# Patient Record
Sex: MALE | Race: BLACK OR AFRICAN AMERICAN | Employment: FULL TIME | URBAN - METROPOLITAN AREA
[De-identification: names, ages, dates, MRNs, and addresses within clinical notes are randomized per-mention and may not be internally consistent; named-entity substitution may affect disease eponyms.]

---

## 2018-09-05 ENCOUNTER — HOSPITAL ENCOUNTER (EMERGENCY)
Age: 48
Discharge: HOME | End: 2018-09-05
Attending: EMERGENCY MEDICINE
Payer: SELF-PAY

## 2018-09-05 VITALS
SYSTOLIC BLOOD PRESSURE: 142 MMHG | TEMPERATURE: 97.9 F | OXYGEN SATURATION: 100 % | HEART RATE: 100 BPM | DIASTOLIC BLOOD PRESSURE: 95 MMHG | RESPIRATION RATE: 14 BRPM

## 2018-09-05 DIAGNOSIS — K64.8 INTERNAL HEMORRHOID: Primary | ICD-10-CM

## 2018-09-05 DIAGNOSIS — K60.2 RECTAL FISSURE: ICD-10-CM

## 2018-09-05 DIAGNOSIS — K62.89 RECTAL PAIN: ICD-10-CM

## 2018-09-05 DIAGNOSIS — R03.0 ELEVATED BLOOD PRESSURE READING: ICD-10-CM

## 2018-09-05 PROCEDURE — 74011000250 HC RX REV CODE- 250: Performed by: EMERGENCY MEDICINE

## 2018-09-05 PROCEDURE — 99283 EMERGENCY DEPT VISIT LOW MDM: CPT

## 2018-09-05 RX ORDER — DOCUSATE SODIUM 100 MG/1
100 CAPSULE, LIQUID FILLED ORAL 2 TIMES DAILY
Qty: 60 CAP | Refills: 2 | Status: SHIPPED | OUTPATIENT
Start: 2018-09-05 | End: 2018-12-04

## 2018-09-05 RX ORDER — LIDOCAINE HYDROCHLORIDE 20 MG/ML
JELLY TOPICAL AS NEEDED
Status: DISCONTINUED | OUTPATIENT
Start: 2018-09-05 | End: 2018-09-05 | Stop reason: HOSPADM

## 2018-09-05 RX ORDER — HYDROCORTISONE ACETATE 25 MG/1
25 SUPPOSITORY RECTAL EVERY 12 HOURS
Qty: 14 EACH | Refills: 0 | Status: SHIPPED | OUTPATIENT
Start: 2018-09-05 | End: 2018-09-12

## 2018-09-05 RX ORDER — IBUPROFEN 600 MG/1
600 TABLET ORAL
Qty: 20 TAB | Refills: 0 | Status: SHIPPED | OUTPATIENT
Start: 2018-09-05 | End: 2021-06-10 | Stop reason: ALTCHOICE

## 2018-09-05 RX ADMIN — LIDOCAINE HYDROCHLORIDE: 20 JELLY TOPICAL at 19:55

## 2018-09-05 NOTE — ED TRIAGE NOTES
Patient arrived from home c.o hemorrhoids. Patient states he has had them for a while. Patient reports pain when sitting

## 2018-09-05 NOTE — ED PROVIDER NOTES
EMERGENCY DEPARTMENT HISTORY AND PHYSICAL EXAM 
 
7:34 PM 
 
 
Date: 9/5/2018 Patient Name: Adan Low History of Presenting Illness Chief Complaint Patient presents with  Anal Pain  
  hemorrhoids History Provided By: Patient Chief Complaint: Rectal Pain Duration:  Years Timing:  Acute on chronic Location: Rectal 
Quality: Zachariah Pi Severity: Severe Modifying Factors: Exacerbated with bowel movements Associated Symptoms: denies any other associated signs or symptoms Additional History (Context): 7:35 PM Adan Low is a 52 y.o. male with no MHx who presents to ED complaining of severe sharp acute on chronic rectal pain that is exacerbated with bowel movements onset years but worsened gradually over the last month. Patient states that he has had hemorrhoids for a couple years now but have been worsening over time. The better now but today when he tried to do his Hermelinda Goody he relaly had pain. He sits down alot for work. His diet has been ok and states that he eats fruits and vegetables. Denies any fevers and other hospital visits. No other concerns or symptoms at this time. PCP: None Past History Past Medical History: No past medical history on file. Past Surgical History: No past surgical history on file. Family History: No family history on file. Social History: 
Social History Substance Use Topics  Smoking status: Not on file  Smokeless tobacco: Not on file  Alcohol use Not on file Allergies: 
No Known Allergies Review of Systems Review of Systems Constitutional: Negative for activity change, fatigue and fever. HENT: Negative for congestion and rhinorrhea. Eyes: Negative for visual disturbance. Respiratory: Negative for shortness of breath. Cardiovascular: Negative for chest pain and palpitations. Gastrointestinal: Positive for rectal pain. Negative for abdominal pain, diarrhea, nausea and vomiting. Genitourinary: Negative for dysuria and hematuria. Musculoskeletal: Negative for back pain. Skin: Negative for rash. Neurological: Negative for dizziness, weakness and light-headedness. All other systems reviewed and are negative. Physical Exam  
 
Visit Vitals  BP (!) 142/95 (BP 1 Location: Left arm, BP Patient Position: At rest)  Pulse 100  Temp 97.9 °F (36.6 °C)  Resp 14  SpO2 100% Physical Exam  
Constitutional: He is oriented to person, place, and time. He appears well-developed and well-nourished. No distress. Obese HENT:  
Head: Normocephalic and atraumatic. Right Ear: External ear normal.  
Left Ear: External ear normal.  
Nose: Nose normal.  
Mouth/Throat: Oropharynx is clear and moist.  
Eyes: Conjunctivae and EOM are normal. Pupils are equal, round, and reactive to light. No scleral icterus. Neck: Normal range of motion. Neck supple. No JVD present. No tracheal deviation present. No thyromegaly present. Cardiovascular: Normal rate, regular rhythm, normal heart sounds and intact distal pulses. Exam reveals no gallop and no friction rub. No murmur heard. Pulmonary/Chest: Effort normal and breath sounds normal. He exhibits no tenderness. Abdominal: Soft. Bowel sounds are normal. He exhibits no distension. There is no tenderness. There is no rebound and no guarding. Genitourinary: Rectal exam shows external hemorrhoid, internal hemorrhoid, fissure and tenderness. Genitourinary Comments: Multiple internal hemorrhoids No bleeding Very tender at 6 oclock position, suspect there is a rectal fissure. Nontender external hemorrhoid noted at he 6 oclock position as well. Musculoskeletal: Normal range of motion. He exhibits no edema or tenderness. Lymphadenopathy:  
  He has no cervical adenopathy. Neurological: He is alert and oriented to person, place, and time. No cranial nerve deficit.  Coordination normal.  
 No sensory loss, Gait normal, Motor 5/5 Skin: Skin is warm and dry. Psychiatric: He has a normal mood and affect. His behavior is normal. Judgment and thought content normal.  
Nursing note and vitals reviewed. Diagnostic Study Results Labs - No results found for this or any previous visit (from the past 12 hour(s)). Radiologic Studies - No orders to display Medical Decision Making I am the first provider for this patient. I reviewed the vital signs, available nursing notes, past medical history, past surgical history, family history and social history. Vital Signs-Reviewed the patient's vital signs. Pulse Oximetry Analysis -  100% on room air , WNL Cardiac Monitor: 
Rate: 100 bpm 
 
Records Reviewed: Nursing Notes and Old Medical Records (Time of Review: 7:34 PM) 
 
ED Course: Progress Notes, Reevaluation, and Consults: 
 
 
Provider Notes (Medical Decision Making):  
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 There is no bleeding. Exam suggestive of hemorrhoids and rectal fissure. Will give lidocaine jelly for comforts, call in an anal suppository as well as ibuprofen and give colace for his pain. Diagnosis Clinical Impression:  
1. Internal hemorrhoid 2. Rectal pain 3. Elevated blood pressure reading 4. Rectal fissure Disposition: DC Follow-up Information Follow up With Details Comments Contact Info [facility] EMERGENCY DEPT Go to If symptoms worsen, As needed [address] [place] [address] [address] [facility] Char Springer MD Schedule an appointment as soon as possible for a visit in 1 week  [address] 
[phone] Patient's Medications Start Taking DOCUSATE SODIUM (COLACE) 100 MG CAPSULE    Take 1 Cap by mouth two (2) times a day for 90 days. HYDROCORTISONE (ANUSOL-HC) 25 MG SUPP    Insert 1 Suppository into rectum every twelve (12) hours for 7 days. IBUPROFEN (MOTRIN) 600 MG TABLET    Take 1 Tab by mouth every six (6) hours as needed for Pain. Continue Taking No medications on file These Medications have changed No medications on file Stop Taking No medications on file  
 
_______________________________ Attestations: 
Scribe Attestation Tsering Almazan acting as a scribe for and in the presence of Aliza Matt MD     
September 05, 2018 at 7:34 PM 
    
Provider Attestation:     
I personally performed the services described in the documentation, reviewed the documentation, as recorded by the scribe in my presence, and it accurately and completely records my words and actions. September 05, 2018 at 7:34 PM - Aliza Matt MD   
_______________________________

## 2018-09-06 NOTE — DISCHARGE INSTRUCTIONS
Anal Fissure: Care Instructions  Your Care Instructions  An anal fissure is a tear in the lining of the lower rectum (anus). It can itch and cause pain. You may notice bright red blood on toilet paper after you wipe. A fissure may form if you are constipated and try to pass a large, hard stool or if you do not relax your anal muscles during a bowel movement. Most anal fissures heal with home treatment after a few days or weeks. If you have an anal fissure that takes more time to heal, your doctor may prescribe medicine. In rare cases, surgery may be needed. Anal fissures do not lead to colon cancer or other serious illnesses. However, if you have blood mixed in with the stool, talk to your doctor. Follow-up care is a key part of your treatment and safety. Be sure to make and go to all appointments, and call your doctor if you are having problems. It's also a good idea to know your test results and keep a list of the medicines you take. How can you care for yourself at home? · If your doctor prescribed cream or ointment, use it exactly as prescribed. Call your doctor if you think you are having a problem with your medicine. You will get more details on the specific medicines your doctor prescribes. · Sit in a few inches of warm water (sitz bath) 3 times a day and after bowel movements. The warm water helps the area heal and eases discomfort. Do not put soaps, salts, or shampoos in the water. · Avoid constipation:  ¨ Include fruits, vegetables, beans, and whole grains in your diet each day. These foods are high in fiber. ¨ Drink plenty of fluids, enough so that your urine is light yellow or clear like water. If you have kidney, heart, or liver disease and have to limit fluids, talk with your doctor before you increase the amount of fluids you drink. ¨ Get some exercise every day. Build up slowly to 30 to 60 minutes a day on 5 or more days of the week.   ¨ Take a fiber supplement, such as Benefiber, Citrucel, or Metamucil, every day if needed. Read and follow all instructions on the label. ¨ Use the toilet when you feel the urge. Or when you can, schedule time each day for a bowel movement. A daily routine may help. Take your time and do not strain when having a bowel movement. But do not sit on the toilet too long. · Support your feet with a small step stool when you sit on the toilet. This helps flex your hips and places your pelvis in a squatting position. · Your doctor may recommend an over-the-counter laxative, such as Miralax, Milk of Magnesia, or Ex-Lax. Read and follow all instructions on the label, and do not use these medicines on a long-term basis. · Do not use over-the-counter ointments or creams without talking to your doctor. Some of these preparations may not help. · Use baby wipes or medicated pads, such as Preparation H or Tucks, instead of toilet paper to clean after a bowel movement. These products do not irritate the anus. · Be safe with medicines. Read and follow all instructions on the label. ¨ If the doctor gave you a prescriptionmedicine for pain, take it as prescribed. ¨ If you are not taking a prescription pain medicine, ask your doctorif you can take an over-the-counter medicine. When should you call for help? Call your doctor now or seek immediate medical care if:    · You have new or worse pain.     · You have new or worse bleeding from the rectum.    Watch closely for changes in your health, and be sure to contact your doctor if:    · You have trouble passing stools.     · You do not get better as expected. Where can you learn more? Go to http://branden-efren.info/. Enter Y329 in the search box to learn more about \"Anal Fissure: Care Instructions. \"  Current as of: May 12, 2017  Content Version: 11.7  © 0913-1727 "[employer].  Care instructions adapted under license by [employer] (which disclaims liability or warranty for this information). If you have questions about a medical condition or this instruction, always ask your healthcare professional. [address] any warranty or liability for your use of this information. Hemorrhoids: Care Instructions  Your Care Instructions    Hemorrhoids are enlarged veins that develop in the anal canal. Bleeding during bowel movements, itching, swelling, and rectal pain are the most common symptoms. They can be uncomfortable at times, but hemorrhoids rarely are a serious problem. You can treat most hemorrhoids with simple changes to your diet and bowel habits. These changes include eating more fiber and not straining to pass stools. Most hemorrhoids do not need surgery or other treatment unless they are very large and painful or bleed a lot. Follow-up care is a key part of your treatment and safety. Be sure to make and go to all appointments, and call your doctor if you are having problems. It's also a good idea to know your test results and keep a list of the medicines you take. How can you care for yourself at home? · Sit in a few inches of warm water (sitz bath) 3 times a day and after bowel movements. The warm water helps with pain and itching. · Put ice on your anal area several times a day for 10 minutes at a time. Put a thin cloth between the ice and your skin. Follow this by placing a warm, wet towel on the area for another 10 to 20 minutes. · Take pain medicines exactly as directed. ¨ If the doctor gave you a prescription medicine for pain, take it as prescribed. ¨ If you are not taking a prescription pain medicine, ask your doctor if you can take an over-the-counter medicine. · Keep the anal area clean, but be gentle. Use water and a fragrance-free soap, such as [place], or use baby wipes or medicated pads, such as Tucks. · Wear cotton underwear and loose clothing to decrease moisture in the anal area. · Eat more fiber.  Include foods such as whole-grain breads and cereals, raw vegetables, raw and dried fruits, and beans. · Drink plenty of fluids, enough so that your urine is light yellow or clear like water. If you have kidney, heart, or liver disease and have to limit fluids, talk with your doctor before you increase the amount of fluids you drink. · Use a stool softener that contains bran or psyllium. You can save money by buying bran or psyllium (available in bulk at most health food stores) and sprinkling it on foods or stirring it into fruit juice. Or you can use a product such as Metamucil or Hydrocil. · Practice healthy bowel habits. ¨ Go to the bathroom as soon as you have the urge. ¨ Avoid straining to pass stools. Relax and give yourself time to let things happen naturally. ¨ Do not hold your breath while passing stools. ¨ Do not read while sitting on the toilet. Get off the toilet as soon as you have finished. · Take your medicines exactly as prescribed. Call your doctor if you think you are having a problem with your medicine. When should you call for help? Call 911 anytime you think you may need emergency care. For example, call if:    · You pass maroon or very bloody stools.    Call your doctor now or seek immediate medical care if:    · You have increased pain.     · You have increased bleeding.    Watch closely for changes in your health, and be sure to contact your doctor if:    · Your symptoms have not improved after 3 or 4 days. Where can you learn more? Go to http://branden-efren.info/. Enter F228 in the search box to learn more about \"Hemorrhoids: Care Instructions. \"  Current as of: May 12, 2017  Content Version: 11.7  © 0846-0594 [employer]. Care instructions adapted under license by [employer] (which disclaims liability or warranty for this information).  If you have questions about a medical condition or this instruction, always ask your healthcare professional. [employer] disclaims any warranty or liability for your use of this information.

## 2019-07-22 ENCOUNTER — OFFICE VISIT (OUTPATIENT)
Dept: SURGERY | Age: 49
End: 2019-07-22

## 2019-07-22 VITALS
RESPIRATION RATE: 18 BRPM | BODY MASS INDEX: 44.1 KG/M2 | SYSTOLIC BLOOD PRESSURE: 132 MMHG | DIASTOLIC BLOOD PRESSURE: 84 MMHG | HEART RATE: 75 BPM | WEIGHT: 315 LBS | HEIGHT: 71 IN | TEMPERATURE: 98.3 F

## 2019-07-22 DIAGNOSIS — K62.5 RECTAL BLEEDING: ICD-10-CM

## 2019-07-22 DIAGNOSIS — E66.01 OBESITY, MORBID (HCC): ICD-10-CM

## 2019-07-22 DIAGNOSIS — K64.0 GRADE I HEMORRHOIDS: Primary | ICD-10-CM

## 2019-07-22 NOTE — PATIENT INSTRUCTIONS
Metamucil or Citrucel fiber powder daily    Discontinue use of all wipes     Use bland soap such as Dove    Do not over clean area, 1-2 swipes then done    After washing use barrier cream such as Desitin or A & D ointment

## 2019-07-22 NOTE — LETTER
7/22/19 Patient: Elizabeth Sandoval YOB: 1970 Date of Visit: 7/22/2019 Lizz Hart, [address]: [phone] Dear Kim Walter, I saw Ramsey Lofton in the office today for his hemorrhoids. They have been particularly worse over the last 3 weeks. On exam he really does not have any significant hemorrhoidal disease. I have recommended a high-fiber diet with a fiber supplement and we will schedule him for a colonoscopy to rule out other causes of bleeding. We have also discussed proper perianal hygiene as this may be causing some discomfort as well. He will follow-up in the office PRN. If you have questions, please do not hesitate to call me. I look forward to following your patient along with you. Sincerely, Rosita Jenkins MD

## 2019-07-22 NOTE — PROGRESS NOTES
HPI: Brady Ramon is a 50 y.o. male presenting with chief complain of hemorrhoids. He has suffered from this for the last 3 to 4 years but it has been worse over the last 3 weeks. He states he sees some blood on the toilet paper with wiping. It is alleviated with ibuprofen. Sometimes bowel movements make it more painful. He denies fevers or chills. He denies drainage. He denies abdominal complaints. He moves his bowels once per day. He occasionally takes a stool softener. He denies fecal incontinence. He has never had a colonoscopy and denies family history of colorectal cancer. Past medical history: Negative    Past surgical history: Negative    Family History   Problem Relation Age of Onset    Diabetes Mother        Social History     Socioeconomic History    Marital status:      Spouse name: Not on file    Number of children: Not on file    Years of education: Not on file    Highest education level: Not on file   Tobacco Use    Smoking status: Never Smoker    Smokeless tobacco: Never Used   Substance and Sexual Activity    Alcohol use: Never     Frequency: Never       Review of Systems - Review of Systems   Constitutional: Negative. HENT: Negative. Eyes: Negative. Respiratory: Negative. Cardiovascular: Negative. Gastrointestinal: Negative. Genitourinary: Negative. Musculoskeletal: Negative. Skin: Negative. Neurological: Negative. Endo/Heme/Allergies: Negative. Psychiatric/Behavioral: Negative. Outpatient Medications Marked as Taking for the 7/22/19 encounter (Office Visit) with Almaz Shabazz MD   Medication Sig Dispense Refill    ibuprofen (MOTRIN) 600 mg tablet Take 1 Tab by mouth every six (6) hours as needed for Pain. 20 Tab 0       No Known Allergies    Vitals:    07/22/19 1314   Weight: 143.3 kg (316 lb)   PainSc:   0 - No pain       Physical Exam   Constitutional: He appears well-developed and well-nourished.    HENT: Head: Normocephalic and atraumatic. Eyes: Conjunctivae and EOM are normal.   Abdominal: Soft. He exhibits no distension and no mass. There is no tenderness. Musculoskeletal: Normal range of motion. Lymphadenopathy:     He has no cervical adenopathy. Right: No inguinal adenopathy present. Left: No inguinal adenopathy present. Neurological: No sensory deficit. Skin: Skin is warm and dry. Psychiatric: He has a normal mood and affect. His speech is normal.   Rectum: No external hemorrhoids, no fissure, no abscess  Digital rectal exam: Moderate tone, no mass  Anoscopy: No significant internal hemorrhoids    Assessment / Plan    Grade 1 hemorrhoids, rectal bleeding  Schedule colonoscopy to rule out other causes of rectal bleeding  High-fiber diet with fiber supplement  Hemorrhoids are essentially very mild and I do not think that hemorrhoidectomy would benefit him    The diagnoses and plan were discussed with the patient. All questions answered. Plan of care agreed to by all concerned.

## 2020-09-09 ENCOUNTER — OFFICE VISIT (OUTPATIENT)
Dept: CARDIOLOGY CLINIC | Age: 50
End: 2020-09-09

## 2020-09-09 VITALS
WEIGHT: 292.8 LBS | HEART RATE: 95 BPM | TEMPERATURE: 97.3 F | OXYGEN SATURATION: 99 % | DIASTOLIC BLOOD PRESSURE: 75 MMHG | HEIGHT: 71 IN | BODY MASS INDEX: 40.99 KG/M2 | SYSTOLIC BLOOD PRESSURE: 143 MMHG

## 2020-09-09 DIAGNOSIS — R94.31 ABNORMAL EKG: Primary | ICD-10-CM

## 2020-09-09 DIAGNOSIS — E66.01 CLASS 3 SEVERE OBESITY WITHOUT SERIOUS COMORBIDITY WITH BODY MASS INDEX (BMI) OF 40.0 TO 44.9 IN ADULT, UNSPECIFIED OBESITY TYPE (HCC): ICD-10-CM

## 2020-09-09 DIAGNOSIS — I45.10 RIGHT BUNDLE BRANCH BLOCK: ICD-10-CM

## 2020-09-09 DIAGNOSIS — R60.0 BILATERAL LEG EDEMA: ICD-10-CM

## 2020-09-09 DIAGNOSIS — R00.2 PALPITATION: ICD-10-CM

## 2020-09-09 NOTE — PROGRESS NOTES
HISTORY OF PRESENT ILLNESS  James Katz is a 52 y.o. male. 9/2020  Patient seen today for new patient evaluation. He is referred here for abnormal EKG  For about a month he has been having episode of palpitation described as a thumping the heart rate happens on and off. Denies any dizziness or syncope. EKG today shows right bundle branch block. He denies any chest pain, shortness of breath he has episodes of edema in legs is going on and off for some time. Is a more dependent. Denies orthopnea PND. No fever chills or other symptom. No prior cardiac history      Review of Systems   Constitutional: Negative for chills and fever. HENT: Negative for nosebleeds. Eyes: Negative for blurred vision and double vision. Respiratory: Negative for cough, hemoptysis, sputum production, shortness of breath and wheezing. Cardiovascular: Negative for chest pain, palpitations, orthopnea, claudication, leg swelling and PND. Gastrointestinal: Negative for abdominal pain, heartburn, nausea and vomiting. Musculoskeletal: Negative for myalgias. Skin: Negative for rash. Neurological: Negative for dizziness, weakness and headaches. Endo/Heme/Allergies: Does not bruise/bleed easily. Family History   Problem Relation Age of Onset    Diabetes Mother     Stroke Father        History reviewed. No pertinent past medical history. History reviewed. No pertinent surgical history. Social History     Tobacco Use    Smoking status: Never Smoker    Smokeless tobacco: Never Used   Substance Use Topics    Alcohol use: Never     Frequency: Never       No Known Allergies    Prior to Admission medications    Medication Sig Start Date End Date Taking? Authorizing Provider   ibuprofen (MOTRIN) 600 mg tablet Take 1 Tab by mouth every six (6) hours as needed for Pain.  9/5/18   Perfecto Tate MD         Visit Vitals  /75 (BP 1 Location: Left arm, BP Patient Position: Sitting)   Pulse 95   Temp 97.3 °F (36.3 °C) (Temporal)   Ht 5' 11\" (1.803 m)   Wt 132.8 kg (292 lb 12.8 oz)   SpO2 99%   BMI 40.84 kg/m²         Physical Exam   Constitutional: He is oriented to person, place, and time. He appears well-developed and well-nourished. HENT:   Head: Normocephalic and atraumatic. Eyes: Conjunctivae are normal.   Neck: Neck supple. No JVD present. No tracheal deviation present. No thyromegaly present. Cardiovascular: Normal rate, regular rhythm and normal heart sounds. Exam reveals no gallop and no friction rub. No murmur heard. Pulmonary/Chest: Breath sounds normal. No respiratory distress. He has no wheezes. He has no rales. He exhibits no tenderness. Abdominal: Soft. There is no abdominal tenderness. Musculoskeletal:         General: No edema. Neurological: He is alert and oriented to person, place, and time. Skin: Skin is warm and dry. Psychiatric: He has a normal mood and affect. Mr. Alta Melvin has a reminder for a \"due or due soon\" health maintenance. I have asked that he contact his primary care provider for follow-up on this health maintenance. No flowsheet data found. Assessment         ICD-10-CM ICD-9-CM    1. Abnormal EKG  R94.31 794.31 AMB POC EKG ROUTINE W/ 12 LEADS, INTER & REP      CARDIAC HOLTER MONITOR      ECHO ADULT COMPLETE      EXERCISE CARDIAC STRESS TEST      METABOLIC PANEL, BASIC      CBC WITH AUTOMATED DIFF      T4, FREE      TSH 3RD GENERATION      LIPID PANEL    New right bundle branch block on EKG. 2. Right bundle branch block  I45.10 426.4 CARDIAC HOLTER MONITOR      ECHO ADULT COMPLETE      EXERCISE CARDIAC STRESS TEST      METABOLIC PANEL, BASIC      CBC WITH AUTOMATED DIFF      T4, FREE      TSH 3RD GENERATION      LIPID PANEL    Unclear etiology.    3. Palpitation  R00.2 785.1 CARDIAC HOLTER MONITOR      ECHO ADULT COMPLETE      EXERCISE CARDIAC STRESS TEST      METABOLIC PANEL, BASIC      CBC WITH AUTOMATED DIFF      T4, FREE      TSH 3RD GENERATION      LIPID PANEL    Rule out arrhythmia or heart block   4. Class 3 severe obesity without serious comorbidity with body mass index (BMI) of 40.0 to 44.9 in adult, unspecified obesity type ([facility]  E66.01 278.01     Z68.41 V85.41     Continue with diet and exercise   5. Bilateral leg edema  R60.0 782.3     Rule hypertension or LV dysfunction       There are no discontinued medications. Orders Placed This Encounter    METABOLIC PANEL, BASIC     Standing Status:   Future     Standing Expiration Date:   10/9/2020    CBC WITH AUTOMATED DIFF     Standing Status:   Future     Standing Expiration Date:   10/9/2020    T4, FREE     Standing Status:   Future     Standing Expiration Date:   9/10/2021    TSH 3RD GENERATION     Standing Status:   Future     Standing Expiration Date:   10/9/2020    LIPID PANEL     Standing Status:   Future     Standing Expiration Date:   3/10/2021    AMB POC EKG ROUTINE W/ 12 LEADS, INTER & REP     Order Specific Question:   Reason for Exam:     Answer:   Abnormal EKG    ECHO ADULT COMPLETE     Standing Status:   Future     Standing Expiration Date:   9/9/2021     Order Specific Question:   Contrast Enhancement (Bubble Study, Definity, Optison) may be used if criteria listed in established evidence-based protocol has been identified. Answer:   Yes       Follow-up and Dispositions    · Return for F/u after tests.

## 2020-10-05 DIAGNOSIS — R94.31 ABNORMAL EKG: ICD-10-CM

## 2020-10-05 DIAGNOSIS — I45.10 RIGHT BUNDLE BRANCH BLOCK: ICD-10-CM

## 2020-10-05 DIAGNOSIS — R00.2 PALPITATION: ICD-10-CM

## 2020-10-12 ENCOUNTER — OFFICE VISIT (OUTPATIENT)
Dept: CARDIOLOGY CLINIC | Age: 50
End: 2020-10-12
Payer: COMMERCIAL

## 2020-10-12 VITALS
HEIGHT: 71 IN | TEMPERATURE: 97 F | WEIGHT: 294.6 LBS | DIASTOLIC BLOOD PRESSURE: 75 MMHG | BODY MASS INDEX: 41.24 KG/M2 | HEART RATE: 78 BPM | OXYGEN SATURATION: 98 % | SYSTOLIC BLOOD PRESSURE: 142 MMHG

## 2020-10-12 DIAGNOSIS — I45.10 RIGHT BUNDLE BRANCH BLOCK: ICD-10-CM

## 2020-10-12 DIAGNOSIS — R94.31 ABNORMAL EKG: Primary | ICD-10-CM

## 2020-10-12 DIAGNOSIS — I51.7 LVH (LEFT VENTRICULAR HYPERTROPHY): ICD-10-CM

## 2020-10-12 DIAGNOSIS — R60.0 BILATERAL LEG EDEMA: ICD-10-CM

## 2020-10-12 PROCEDURE — 99213 OFFICE O/P EST LOW 20 MIN: CPT | Performed by: INTERNAL MEDICINE

## 2020-10-12 NOTE — PROGRESS NOTES
1. Have you been to the ER, urgent care clinic since your last visit? Hospitalized since your last visit? No    2. Have you seen or consulted any other health care providers outside of the [address] [place] [address] since your last visit? Include any pap smears or colon screening.  No

## 2020-10-12 NOTE — PROGRESS NOTES
HISTORY OF PRESENT ILLNESS  Augustina Valentin is a 52 y.o. male. 9/2020  Patient seen today for new patient evaluation. He is referred here for abnormal EKG  For about a month he has been having episode of palpitation described as a thumping the heart rate happens on and off. Denies any dizziness or syncope. EKG today shows right bundle branch block. He denies any chest pain, shortness of breath he has episodes of edema in legs is going on and off for some time. Is a more dependent. Denies orthopnea PND. No fever chills or other symptom. No prior cardiac history      Review of Systems   Constitutional: Negative for chills and fever. HENT: Negative for nosebleeds. Eyes: Negative for blurred vision and double vision. Respiratory: Negative for cough, hemoptysis, sputum production, shortness of breath and wheezing. Cardiovascular: Negative for chest pain, palpitations, orthopnea, claudication, leg swelling and PND. Gastrointestinal: Negative for abdominal pain, heartburn, nausea and vomiting. Musculoskeletal: Negative for myalgias. Skin: Negative for rash. Neurological: Negative for dizziness, weakness and headaches. Endo/Heme/Allergies: Does not bruise/bleed easily. Family History   Problem Relation Age of Onset    Diabetes Mother     Stroke Father        No past medical history on file. No past surgical history on file. Social History     Tobacco Use    Smoking status: Never Smoker    Smokeless tobacco: Never Used   Substance Use Topics    Alcohol use: Never     Frequency: Never       No Known Allergies    Prior to Admission medications    Medication Sig Start Date End Date Taking? Authorizing Provider   ibuprofen (MOTRIN) 600 mg tablet Take 1 Tab by mouth every six (6) hours as needed for Pain.  9/5/18   Christa Fuentes MD         Visit Vitals  BP (!) 142/75 (BP 1 Location: Left arm, BP Patient Position: Sitting)   Pulse 78   Temp 97 °F (36.1 °C) (Temporal) Ht 5' 11\" (1.803 m)   Wt 133.6 kg (294 lb 9.6 oz)   SpO2 98%   BMI 41.09 kg/m²         Physical Exam   Constitutional: He is oriented to person, place, and time. He appears well-developed and well-nourished. HENT:   Head: Normocephalic and atraumatic. Eyes: Conjunctivae are normal.   Neck: Neck supple. No JVD present. No tracheal deviation present. No thyromegaly present. Cardiovascular: Normal rate, regular rhythm and normal heart sounds. Exam reveals no gallop and no friction rub. No murmur heard. Pulmonary/Chest: Breath sounds normal. No respiratory distress. He has no wheezes. He has no rales. He exhibits no tenderness. Abdominal: Soft. There is no abdominal tenderness. Musculoskeletal:         General: No edema. Neurological: He is alert and oriented to person, place, and time. Skin: Skin is warm and dry. Psychiatric: He has a normal mood and affect. Mr. Jack Harper has a reminder for a \"due or due soon\" health maintenance. I have asked that he contact his primary care provider for follow-up on this health maintenance. No flowsheet data found. Interpretation Summary 10/2020    · LV: Estimated LVEF is 55 - 60%. Normal cavity size, systolic function (ejection fraction normal) and diastolic function. Moderate concentric hypertrophy. Wall motion: normal.  · LA: Left Atrium volume index is 27.14 mL/m2. · RV: Normal right ventricular size and function. · No hemodynamically significant valvular pathology. · Pericardium: Pericardial fat pad present. Stress test-9/2020  negatine     Holter-10/2020  No significant arrhythmia   Assessment         ICD-10-CM ICD-9-CM    1. Abnormal EKG  R94.31 794.31     stable  negative stress test  monitor   2. Bilateral leg edema  R60.0 782.3     normal lvef  no pulmonary htn  leg elevation-support stockings   3. Right bundle branch block  I45.10 426.4    4. LVH (left ventricular hypertrophy)  I51.7 429.3     Moderate.   Continue with diet and exercise and monitor blood pressure. 10/2020  Stable cardiac status. lvh on echo-diet exercise  There are no discontinued medications. No orders of the defined types were placed in this encounter. Follow-up and Dispositions    · Return in about 1 year (around 10/12/2021).

## 2020-11-23 PROBLEM — Z12.11 SCREENING FOR MALIGNANT NEOPLASM OF COLON: Status: ACTIVE | Noted: 2020-11-13

## 2020-11-23 PROBLEM — I87.2 PERIPHERAL VENOUS INSUFFICIENCY: Status: ACTIVE | Noted: 2020-11-23

## 2020-11-23 PROBLEM — R60.0 PEDAL EDEMA: Status: ACTIVE | Noted: 2020-11-23

## 2020-11-23 PROBLEM — Z83.3 FAMILY HISTORY OF DIABETES MELLITUS: Status: ACTIVE | Noted: 2020-11-23

## 2020-11-23 PROBLEM — E11.9 DIABETES MELLITUS WITHOUT COMPLICATION (HCC): Status: ACTIVE | Noted: 2020-11-23

## 2020-11-23 PROBLEM — N52.9 IMPOTENCE: Status: ACTIVE | Noted: 2020-11-23

## 2020-11-23 PROBLEM — E78.5 HYPERLIPIDEMIA: Status: ACTIVE | Noted: 2020-11-23

## 2020-11-23 PROBLEM — D72.820 LYMPHOCYTOSIS: Status: ACTIVE | Noted: 2020-11-23

## 2021-07-09 PROCEDURE — 75810000223 HC DENTAL PROCEDURE

## 2021-07-09 PROCEDURE — 99283 EMERGENCY DEPT VISIT LOW MDM: CPT

## 2021-07-10 ENCOUNTER — APPOINTMENT (OUTPATIENT)
Dept: CT IMAGING | Age: 51
End: 2021-07-10
Attending: STUDENT IN AN ORGANIZED HEALTH CARE EDUCATION/TRAINING PROGRAM
Payer: COMMERCIAL

## 2021-07-10 ENCOUNTER — HOSPITAL ENCOUNTER (EMERGENCY)
Age: 51
Discharge: HOME | End: 2021-07-10
Attending: EMERGENCY MEDICINE
Payer: COMMERCIAL

## 2021-07-10 VITALS
HEIGHT: 71 IN | BODY MASS INDEX: 42.7 KG/M2 | OXYGEN SATURATION: 99 % | HEART RATE: 93 BPM | DIASTOLIC BLOOD PRESSURE: 100 MMHG | TEMPERATURE: 99.3 F | WEIGHT: 305 LBS | SYSTOLIC BLOOD PRESSURE: 134 MMHG | RESPIRATION RATE: 16 BRPM

## 2021-07-10 DIAGNOSIS — K04.7 DENTAL ABSCESS: Primary | ICD-10-CM

## 2021-07-10 LAB
ANION GAP SERPL CALC-SCNC: 6 MMOL/L (ref 3–18)
BASOPHILS # BLD: 0.1 K/UL (ref 0–0.1)
BASOPHILS NFR BLD: 1 % (ref 0–2)
BUN SERPL-MCNC: 6 MG/DL (ref 7–18)
BUN/CREAT SERPL: 5 (ref 12–20)
CALCIUM SERPL-MCNC: 9.5 MG/DL (ref 8.5–10.1)
CHLORIDE SERPL-SCNC: 101 MMOL/L (ref 100–111)
CO2 SERPL-SCNC: 31 MMOL/L (ref 21–32)
CREAT SERPL-MCNC: 1.14 MG/DL (ref 0.6–1.3)
DIFFERENTIAL METHOD BLD: NORMAL
EOSINOPHIL # BLD: 0.2 K/UL (ref 0–0.4)
EOSINOPHIL NFR BLD: 2 % (ref 0–5)
ERYTHROCYTE [DISTWIDTH] IN BLOOD BY AUTOMATED COUNT: 13.4 % (ref 11.6–14.5)
GLUCOSE SERPL-MCNC: 113 MG/DL (ref 74–99)
HCT VFR BLD AUTO: 40 % (ref 36–48)
HGB BLD-MCNC: 13.3 G/DL (ref 13–16)
LYMPHOCYTES # BLD: 3.1 K/UL (ref 0.9–3.6)
LYMPHOCYTES NFR BLD: 33 % (ref 21–52)
MCH RBC QN AUTO: 28.7 PG (ref 24–34)
MCHC RBC AUTO-ENTMCNC: 33.3 G/DL (ref 31–37)
MCV RBC AUTO: 86.4 FL (ref 74–97)
MONOCYTES # BLD: 0.8 K/UL (ref 0.05–1.2)
MONOCYTES NFR BLD: 9 % (ref 3–10)
NEUTS SEG # BLD: 5.3 K/UL (ref 1.8–8)
NEUTS SEG NFR BLD: 56 % (ref 40–73)
PLATELET # BLD AUTO: 282 K/UL (ref 135–420)
PMV BLD AUTO: 11.3 FL (ref 9.2–11.8)
POTASSIUM SERPL-SCNC: 4.4 MMOL/L (ref 3.5–5.5)
RBC # BLD AUTO: 4.63 M/UL (ref 4.35–5.65)
SODIUM SERPL-SCNC: 138 MMOL/L (ref 136–145)
WBC # BLD AUTO: 9.4 K/UL (ref 4.6–13.2)

## 2021-07-10 PROCEDURE — 85025 COMPLETE CBC W/AUTO DIFF WBC: CPT

## 2021-07-10 PROCEDURE — 70487 CT MAXILLOFACIAL W/DYE: CPT

## 2021-07-10 PROCEDURE — 74011000636 HC RX REV CODE- 636: Performed by: EMERGENCY MEDICINE

## 2021-07-10 PROCEDURE — 74011000250 HC RX REV CODE- 250: Performed by: STUDENT IN AN ORGANIZED HEALTH CARE EDUCATION/TRAINING PROGRAM

## 2021-07-10 PROCEDURE — 74011250637 HC RX REV CODE- 250/637: Performed by: STUDENT IN AN ORGANIZED HEALTH CARE EDUCATION/TRAINING PROGRAM

## 2021-07-10 PROCEDURE — 80048 BASIC METABOLIC PNL TOTAL CA: CPT

## 2021-07-10 RX ORDER — LIDOCAINE HYDROCHLORIDE 10 MG/ML
10 INJECTION, SOLUTION EPIDURAL; INFILTRATION; INTRACAUDAL; PERINEURAL ONCE
Status: COMPLETED | OUTPATIENT
Start: 2021-07-10 | End: 2021-07-10

## 2021-07-10 RX ORDER — OXYCODONE AND ACETAMINOPHEN 5; 325 MG/1; MG/1
1 TABLET ORAL
Qty: 12 TABLET | Refills: 0 | Status: SHIPPED | OUTPATIENT
Start: 2021-07-10 | End: 2021-07-13

## 2021-07-10 RX ORDER — ACETAMINOPHEN 500 MG
1000 TABLET ORAL
Status: COMPLETED | OUTPATIENT
Start: 2021-07-10 | End: 2021-07-10

## 2021-07-10 RX ORDER — BUPIVACAINE HYDROCHLORIDE 5 MG/ML
5 INJECTION, SOLUTION EPIDURAL; INTRACAUDAL ONCE
Status: COMPLETED | OUTPATIENT
Start: 2021-07-10 | End: 2021-07-10

## 2021-07-10 RX ORDER — AMOXICILLIN AND CLAVULANATE POTASSIUM 875; 125 MG/1; MG/1
1 TABLET, FILM COATED ORAL 2 TIMES DAILY
Qty: 14 TABLET | Refills: 0 | Status: SHIPPED | OUTPATIENT
Start: 2021-07-10 | End: 2021-09-22 | Stop reason: ALTCHOICE

## 2021-07-10 RX ORDER — CHLORHEXIDINE GLUCONATE 1.2 MG/ML
15 RINSE ORAL EVERY 12 HOURS
Qty: 420 ML | Refills: 0 | Status: SHIPPED | OUTPATIENT
Start: 2021-07-10 | End: 2021-07-24

## 2021-07-10 RX ADMIN — ACETAMINOPHEN 1000 MG: 500 TABLET ORAL at 04:08

## 2021-07-10 RX ADMIN — BUPIVACAINE HYDROCHLORIDE 25 MG: 5 INJECTION, SOLUTION EPIDURAL; INTRACAUDAL; PERINEURAL at 04:36

## 2021-07-10 RX ADMIN — IOPAMIDOL 80 ML: 612 INJECTION, SOLUTION INTRAVENOUS at 03:51

## 2021-07-10 RX ADMIN — LIDOCAINE HYDROCHLORIDE 10 ML: 10 INJECTION, SOLUTION EPIDURAL; INFILTRATION; INTRACAUDAL; PERINEURAL at 04:36

## 2021-07-10 NOTE — ED PROVIDER NOTES
EMERGENCY DEPARTMENT HISTORY AND PHYSICAL EXAM    12:25 AM      Date: 7/10/2021  Patient Name: Ivet Reason    History of Presenting Illness     Chief Complaint   Patient presents with    Dental Pain         History Provided By: Patient  Location/Duration/Severity/Modifying factors   HPI  70-year-old male with past medical history of prediabetes presenting with 2 days of \"tearing\" right upper dental pain is progressively worsened since onset. Is now having some mild pain with chewing. Denies fevers at home. Denies swallowing difficulty or breathing difficulty. PCP: Chaz Ayala, DO    Current Outpatient Medications   Medication Sig Dispense Refill    trospium (SANCTURA XL) 60 mg capsule Take 1 Capsule by mouth Daily (before breakfast). 30 Capsule 6    fluticasone propionate (FLONASE) 50 mcg/actuation nasal spray 2 sprays in each nostril         Past History     Past Medical History:  Past Medical History:   Diagnosis Date    Pre-diabetes        Past Surgical History:  No past surgical history on file. Family History:  Family History   Problem Relation Age of Onset    Diabetes Mother     Stroke Father        Social History:  Social History     Tobacco Use    Smoking status: Never Smoker    Smokeless tobacco: Never Used   Substance Use Topics    Alcohol use: Never    Drug use: Never       Allergies:  No Known Allergies      Review of Systems       Review of Systems   Constitutional: Negative for chills and fever. HENT: Positive for dental problem and facial swelling. Negative for sore throat, trouble swallowing and voice change. Eyes: Negative for pain. Respiratory: Negative for shortness of breath. Cardiovascular: Negative. Gastrointestinal: Negative. Genitourinary: Negative. Musculoskeletal: Negative. Skin: Negative. Neurological: Negative. All other systems reviewed and are negative.         Physical Exam     Visit Vitals  BP (!) 134/100   Pulse 93 Temp 99.3 °F (37.4 °C)   Resp 16   Ht 5' 11\" (1.803 m)   Wt 138.3 kg (305 lb)   SpO2 99%   BMI 42.54 kg/m²         Physical Exam  Vitals and nursing note reviewed. Constitutional:       Appearance: He is obese. HENT:      Head:      Comments: Patient has discrete area of swelling over right maxilla with tenderness over the same area. He also has a palpable bump on his upper right molar gums that is painful to the touch. No pain with percussion of molars. Cardiovascular:      Rate and Rhythm: Normal rate and regular rhythm. Pulmonary:      Effort: Pulmonary effort is normal.      Breath sounds: Normal breath sounds. Musculoskeletal:         General: Normal range of motion. Skin:     General: Skin is warm and dry. Neurological:      Mental Status: He is alert. Diagnostic Study Results     Labs -  No results found for this or any previous visit (from the past 12 hour(s)). Radiologic Studies -   CT MAXILLOFACIAL W CONT    (Results Pending)         Medical Decision Making   I am the first provider for this patient. I reviewed the vital signs, available nursing notes, past medical history, past surgical history, family history and social history. Vital Signs-Reviewed the patient's vital signs. EKG: NA    Records Reviewed: Nursing Notes (Time of Review: 12:25 AM)    ED Course: Progress Notes, Reevaluation, and Consults:         Provider Notes (Medical Decision Making):   MDM  51-year-old prediabetic presenting with 2 days of pain and swelling of his right face. No recent facial or dental trauma that he is aware of. No fevers at home. Vital signs within normal limits. On exam appears that patient likely has an abscess, will evaluate better with facial CT with contrast.  Will get basic labs and treat pain. 04:51-CT confirms dental abscess. Incised and drained abscess at bedside.   Will discharge with Augmentin, chlorhexidine mouthwash, dental referral.    I&D Abcess Simple    Date/Time: 7/10/2021 4:53 AM  Performed by: Akash Li MD  Authorized by: Akash Li MD     Consent:     Consent obtained:  Verbal    Consent given by:  Patient    Risks discussed:  Bleeding and pain  Location:     Type:  Abscess    Location:  Mouth  Anesthesia (see MAR for exact dosages): Anesthesia method:  Nerve block    Block needle gauge:  25 G    Block anesthetic: Lidocaine + Marcaine. Block injection procedure:  Anatomic landmarks identified, introduced needle, incremental injection, anatomic landmarks palpated and negative aspiration for blood    Block outcome:  Anesthesia achieved  Procedure type:     Complexity:  Simple  Procedure details:     Needle aspiration: no      Incision types:  Stab incision    Scalpel blade:  11    Drainage:  Purulent    Drainage amount: Moderate    Wound treatment:  Wound left open  Post-procedure details:     Patient tolerance of procedure: Tolerated well, no immediate complications        Critical Care Time: 0min      Diagnosis     Clinical Impression: No diagnosis found. Disposition: discharge    Follow-up Information    None          Patient's Medications   Start Taking    No medications on file   Continue Taking    FLUTICASONE PROPIONATE (FLONASE) 50 MCG/ACTUATION NASAL SPRAY    2 sprays in each nostril    TROSPIUM (SANCTURA XL) 60 MG CAPSULE    Take 1 Capsule by mouth Daily (before breakfast). These Medications have changed    No medications on file   Stop Taking    No medications on file     Disclaimer: Sections of this note are dictated using utilizing voice recognition software. Minor typographical errors may be present. If questions arise, please do not hesitate to contact me or call our department.

## 2021-07-10 NOTE — DISCHARGE INSTRUCTIONS
You were seen in the ER to evaluate your dental pain. You were found to have a dental abscess. This abscess was drained, however you still need to follow-up closely with a dentist.  A referral has been placed, please call them tomorrow to schedule an appointment. You will still have some drainage from your wound, I you should use chlorhexidine mouthwash and take your antibiotics as prescribed. Return to the ER if you develop fevers, worsening pain or any other concerns.

## 2021-07-10 NOTE — ED TRIAGE NOTES
PT arrived with right side facial and dental pain x 2 days. States \"feels like face is being ripped open\".

## 2021-09-22 PROBLEM — D12.6 BENIGN NEOPLASM OF COLON: Status: ACTIVE | Noted: 2021-09-22

## 2021-09-22 PROBLEM — I10 HYPERTENSION: Status: ACTIVE | Noted: 2021-09-22

## 2021-11-12 ENCOUNTER — HOSPITAL ENCOUNTER (OUTPATIENT)
Dept: LAB | Age: 51
Discharge: HOME | End: 2021-11-12
Payer: COMMERCIAL

## 2021-11-12 ENCOUNTER — TRANSCRIBE ORDER (OUTPATIENT)
Dept: REGISTRATION | Age: 51
End: 2021-11-12

## 2021-11-12 DIAGNOSIS — Z01.812 BLOOD TESTS PRIOR TO TREATMENT OR PROCEDURE: Primary | ICD-10-CM

## 2021-11-12 DIAGNOSIS — Z01.812 BLOOD TESTS PRIOR TO TREATMENT OR PROCEDURE: ICD-10-CM

## 2021-11-12 LAB — SARS-COV-2, NAA: NOT DETECTED

## 2021-11-12 PROCEDURE — U0003 INFECTIOUS AGENT DETECTION BY NUCLEIC ACID (DNA OR RNA); SEVERE ACUTE RESPIRATORY SYNDROME CORONAVIRUS 2 (SARS-COV-2) (CORONAVIRUS DISEASE [COVID-19]), AMPLIFIED PROBE TECHNIQUE, MAKING USE OF HIGH THROUGHPUT TECHNOLOGIES AS DESCRIBED BY CMS-2020-01-R: HCPCS

## 2021-11-17 NOTE — PERIOP NOTES
PRE-SURGICAL INSTRUCTIONS        Patient's Name:  Vonnie Pa      MPNUH'X Date:  11/17/2021            Covid Testing Date and Time:    Surgery Date:  11/18/2021                1. Do NOT eat or drink anything, including candy, gum, or ice chips after midnight on 11/17/2021, unless you have specific instructions from your surgeon or anesthesia provider to do so.  2. You may brush your teeth before coming to the hospital.  3. No smoking 24 hours prior to the day of surgery. 4. No alcohol 24 hours prior to the day of surgery. 5. No recreational drugs for one week prior to the day of surgery. 6. Leave all valuables, including money/purse, at home. 7. Remove all jewelry, nail polish, acrylic nails, and makeup (including mascara); no lotions powders, deodorant, or perfume/cologne/after shave on the skin. 8. Follow instruction for Hibiclens washes and CHG wipes from surgeon's office. 9. Glasses/contact lenses and dentures may be worn to the hospital.  They will be removed prior to surgery. 10. Call your doctor if symptoms of a cold or illness develop within 24-48 hours prior to your surgery. 11.  If you are having an outpatient procedure, please make arrangements for a responsible ADULT TO [address] [place] [address] and stay with you for 24 hours after your surgery. 12. ONE VISITOR in the hospital at this time for outpatient procedures. Exceptions may be made for surgical admissions, per nursing unit guidelines      Special Instructions:      Bring list of CURRENT medications. Bring inhaler. Bring CPAP machine. Bring any pertinent legal medical records. Take these medications the morning of surgery with a sip of water: per MD instruction  Follow physician instructions about insulin. Follow physician instructions about stopping anticoagulants. Complete bowel prep per MD instructions. On the day of surgery, come in the main entrance of [facility].   Let the  at the desk know you are there for surgery. A staff member will come escort you to the surgical area on the second floor. If you have any questions or concerns, please do not hesitate to call:     (Prior to the day of surgery) [facility] department:  [phone]   (Day of surgery) Pre-Op department:  [phone]    These surgical instructions were reviewed with wife Julia Maloney during the [facility] phone call.

## 2021-11-26 ENCOUNTER — HOSPITAL ENCOUNTER (OUTPATIENT)
Dept: PREADMISSION TESTING | Age: 51
Discharge: HOME | End: 2021-11-26
Payer: COMMERCIAL

## 2021-11-26 LAB — SARS-COV-2, NAA: NOT DETECTED

## 2021-11-26 PROCEDURE — U0003 INFECTIOUS AGENT DETECTION BY NUCLEIC ACID (DNA OR RNA); SEVERE ACUTE RESPIRATORY SYNDROME CORONAVIRUS 2 (SARS-COV-2) (CORONAVIRUS DISEASE [COVID-19]), AMPLIFIED PROBE TECHNIQUE, MAKING USE OF HIGH THROUGHPUT TECHNOLOGIES AS DESCRIBED BY CMS-2020-01-R: HCPCS

## 2021-12-01 ENCOUNTER — ANESTHESIA EVENT (OUTPATIENT)
Dept: SURGERY | Age: 51
DRG: 951 | End: 2021-12-01
Payer: COMMERCIAL

## 2021-12-02 ENCOUNTER — APPOINTMENT (OUTPATIENT)
Dept: GENERAL RADIOLOGY | Age: 51
DRG: 951 | End: 2021-12-02
Attending: ANESTHESIOLOGY
Payer: COMMERCIAL

## 2021-12-02 ENCOUNTER — ANESTHESIA (OUTPATIENT)
Dept: SURGERY | Age: 51
DRG: 951 | End: 2021-12-02
Payer: COMMERCIAL

## 2021-12-02 ENCOUNTER — HOSPITAL ENCOUNTER (INPATIENT)
Age: 51
LOS: 1 days | Discharge: HOME HEALTH | DRG: 951 | End: 2021-12-04
Attending: PODIATRIST | Admitting: PODIATRIST
Payer: COMMERCIAL

## 2021-12-02 ENCOUNTER — APPOINTMENT (OUTPATIENT)
Dept: GENERAL RADIOLOGY | Age: 51
DRG: 951 | End: 2021-12-02
Attending: PODIATRIST
Payer: COMMERCIAL

## 2021-12-02 DIAGNOSIS — J96.02 ACUTE RESPIRATORY FAILURE WITH HYPOXIA AND HYPERCAPNIA (HCC): ICD-10-CM

## 2021-12-02 DIAGNOSIS — L76.82 PAIN AT SURGICAL INCISION: Primary | ICD-10-CM

## 2021-12-02 DIAGNOSIS — J96.01 ACUTE RESPIRATORY FAILURE WITH HYPOXIA AND HYPERCAPNIA (HCC): ICD-10-CM

## 2021-12-02 DIAGNOSIS — Z87.898 HISTORY OF PREDIABETES: ICD-10-CM

## 2021-12-02 DIAGNOSIS — Z98.890 STATUS POST HAMMERTOE CORRECTION: ICD-10-CM

## 2021-12-02 DIAGNOSIS — Z87.39 STATUS POST HAMMERTOE CORRECTION: ICD-10-CM

## 2021-12-02 DIAGNOSIS — J18.9 COMMUNITY ACQUIRED PNEUMONIA OF BOTH LUNGS: ICD-10-CM

## 2021-12-02 PROBLEM — J95.89 POST-OP PNEUMONIA: Status: ACTIVE | Noted: 2021-12-02

## 2021-12-02 LAB
ANION GAP SERPL CALC-SCNC: 10 MMOL/L (ref 3–18)
ARTERIAL PATENCY WRIST A: POSITIVE
ATRIAL RATE: 106 BPM
BASE DEFICIT BLD-SCNC: 4.1 MMOL/L
BASOPHILS # BLD: 0.1 K/UL (ref 0–0.1)
BASOPHILS NFR BLD: 1 % (ref 0–2)
BDY SITE: ABNORMAL
BUN SERPL-MCNC: 12 MG/DL (ref 7–18)
BUN/CREAT SERPL: 9 (ref 12–20)
CALCIUM SERPL-MCNC: 8.7 MG/DL (ref 8.5–10.1)
CALCULATED P AXIS, ECG09: 51 DEGREES
CALCULATED R AXIS, ECG10: 30 DEGREES
CALCULATED T AXIS, ECG11: 30 DEGREES
CHLORIDE SERPL-SCNC: 105 MMOL/L (ref 100–111)
CO2 SERPL-SCNC: 23 MMOL/L (ref 21–32)
CREAT SERPL-MCNC: 1.3 MG/DL (ref 0.6–1.3)
DIAGNOSIS, 93000: NORMAL
DIFFERENTIAL METHOD BLD: ABNORMAL
EOSINOPHIL # BLD: 0.1 K/UL (ref 0–0.4)
EOSINOPHIL NFR BLD: 1 % (ref 0–5)
ERYTHROCYTE [DISTWIDTH] IN BLOOD BY AUTOMATED COUNT: 14 % (ref 11.6–14.5)
GAS FLOW.O2 O2 DELIVERY SYS: ABNORMAL L/MIN
GAS FLOW.O2 SETTING OXYMISER: 26 BPM
GLUCOSE BLD STRIP.AUTO-MCNC: 136 MG/DL (ref 70–110)
GLUCOSE BLD STRIP.AUTO-MCNC: 143 MG/DL (ref 70–110)
GLUCOSE SERPL-MCNC: 173 MG/DL (ref 74–99)
HCO3 BLD-SCNC: 23.5 MMOL/L (ref 22–26)
HCT VFR BLD AUTO: 48.7 % (ref 36–48)
HGB BLD-MCNC: 14.8 G/DL (ref 13–16)
IMM GRANULOCYTES # BLD AUTO: 0 K/UL (ref 0–0.04)
IMM GRANULOCYTES NFR BLD AUTO: 0 % (ref 0–0.5)
LYMPHOCYTES # BLD: 2.5 K/UL (ref 0.9–3.6)
LYMPHOCYTES NFR BLD: 26 % (ref 21–52)
MCH RBC QN AUTO: 27.4 PG (ref 24–34)
MCHC RBC AUTO-ENTMCNC: 30.4 G/DL (ref 31–37)
MCV RBC AUTO: 90.2 FL (ref 78–100)
MONOCYTES # BLD: 0.3 K/UL (ref 0.05–1.2)
MONOCYTES NFR BLD: 4 % (ref 3–10)
NEUTS SEG # BLD: 6.5 K/UL (ref 1.8–8)
NEUTS SEG NFR BLD: 69 % (ref 40–73)
NRBC # BLD: 0 K/UL (ref 0–0.01)
NRBC BLD-RTO: 0 PER 100 WBC
O2/TOTAL GAS SETTING VFR VENT: 60 %
P-R INTERVAL, ECG05: 126 MS
PCO2 BLD: 51.3 MMHG (ref 35–45)
PH BLD: 7.27 [PH] (ref 7.35–7.45)
PLATELET # BLD AUTO: 370 K/UL (ref 135–420)
PMV BLD AUTO: 11.4 FL (ref 9.2–11.8)
PO2 BLD: 41 MMHG (ref 80–100)
POTASSIUM SERPL-SCNC: 3.9 MMOL/L (ref 3.5–5.5)
Q-T INTERVAL, ECG07: 380 MS
QRS DURATION, ECG06: 150 MS
QTC CALCULATION (BEZET), ECG08: 504 MS
RBC # BLD AUTO: 5.4 M/UL (ref 4.35–5.65)
SAO2 % BLD: 67.7 % (ref 92–97)
SARS-COV-2, COV2: NORMAL
SERVICE CMNT-IMP: ABNORMAL
SERVICE CMNT-IMP: ABNORMAL
SODIUM SERPL-SCNC: 138 MMOL/L (ref 136–145)
SPECIMEN TYPE: ABNORMAL
VENTRICULAR RATE, ECG03: 106 BPM
WBC # BLD AUTO: 9.4 K/UL (ref 4.6–13.2)

## 2021-12-02 PROCEDURE — 94660 CPAP INITIATION&MGMT: CPT

## 2021-12-02 PROCEDURE — 01480 ANES OPEN PX LOWER L/A/F NOS: CPT | Performed by: NURSE ANESTHETIST, CERTIFIED REGISTERED

## 2021-12-02 PROCEDURE — 80048 BASIC METABOLIC PNL TOTAL CA: CPT

## 2021-12-02 PROCEDURE — 2709999900 HC NON-CHARGEABLE SUPPLY: Performed by: PODIATRIST

## 2021-12-02 PROCEDURE — 74011250636 HC RX REV CODE- 250/636: Performed by: PODIATRIST

## 2021-12-02 PROCEDURE — 74011250636 HC RX REV CODE- 250/636: Performed by: NURSE ANESTHETIST, CERTIFIED REGISTERED

## 2021-12-02 PROCEDURE — 74011250637 HC RX REV CODE- 250/637: Performed by: NURSE ANESTHETIST, CERTIFIED REGISTERED

## 2021-12-02 PROCEDURE — 74011000250 HC RX REV CODE- 250: Performed by: PODIATRIST

## 2021-12-02 PROCEDURE — 0SNQ0ZZ RELEASE LEFT TOE PHALANGEAL JOINT, OPEN APPROACH: ICD-10-PCS | Performed by: PODIATRIST

## 2021-12-02 PROCEDURE — 76060000036 HC ANESTHESIA 2.5 TO 3 HR: Performed by: PODIATRIST

## 2021-12-02 PROCEDURE — 36600 WITHDRAWAL OF ARTERIAL BLOOD: CPT

## 2021-12-02 PROCEDURE — 85025 COMPLETE CBC W/AUTO DIFF WBC: CPT

## 2021-12-02 PROCEDURE — 77030010509 HC AIRWY LMA MSK TELE -A: Performed by: ANESTHESIOLOGY

## 2021-12-02 PROCEDURE — 36415 COLL VENOUS BLD VENIPUNCTURE: CPT

## 2021-12-02 PROCEDURE — 0L8W0ZZ DIVISION OF LEFT FOOT TENDON, OPEN APPROACH: ICD-10-PCS | Performed by: PODIATRIST

## 2021-12-02 PROCEDURE — 77030013480 HC CUF TRNQT J&J -B: Performed by: PODIATRIST

## 2021-12-02 PROCEDURE — 5A09357 ASSISTANCE WITH RESPIRATORY VENTILATION, LESS THAN 24 CONSECUTIVE HOURS, CONTINUOUS POSITIVE AIRWAY PRESSURE: ICD-10-PCS | Performed by: HOSPITALIST

## 2021-12-02 PROCEDURE — 77030013079 HC BLNKT BAIR HGGR 3M -A: Performed by: ANESTHESIOLOGY

## 2021-12-02 PROCEDURE — 82962 GLUCOSE BLOOD TEST: CPT

## 2021-12-02 PROCEDURE — 77030031139 HC SUT VCRL2 J&J -A: Performed by: PODIATRIST

## 2021-12-02 PROCEDURE — 77030008845 HC WRE K STRY -B: Performed by: PODIATRIST

## 2021-12-02 PROCEDURE — 0QBP0ZZ EXCISION OF LEFT METATARSAL, OPEN APPROACH: ICD-10-PCS | Performed by: PODIATRIST

## 2021-12-02 PROCEDURE — 74011000250 HC RX REV CODE- 250: Performed by: NURSE ANESTHETIST, CERTIFIED REGISTERED

## 2021-12-02 PROCEDURE — 77030006773 HC BLD SAW OSC BRSM -A: Performed by: PODIATRIST

## 2021-12-02 PROCEDURE — 77030018836 HC SOL IRR NACL ICUM -A: Performed by: PODIATRIST

## 2021-12-02 PROCEDURE — 71045 X-RAY EXAM CHEST 1 VIEW: CPT

## 2021-12-02 PROCEDURE — 87635 SARS-COV-2 COVID-19 AMP PRB: CPT

## 2021-12-02 PROCEDURE — 82803 BLOOD GASES ANY COMBINATION: CPT

## 2021-12-02 PROCEDURE — 76010000132 HC OR TIME 2.5 TO 3 HR: Performed by: PODIATRIST

## 2021-12-02 PROCEDURE — 93005 ELECTROCARDIOGRAM TRACING: CPT

## 2021-12-02 PROCEDURE — 76210000019 HC OR PH I REC 4 TO 4.5 HR: Performed by: PODIATRIST

## 2021-12-02 PROCEDURE — 0QBR0ZZ EXCISION OF LEFT TOE PHALANX, OPEN APPROACH: ICD-10-PCS | Performed by: PODIATRIST

## 2021-12-02 PROCEDURE — 77030037410 HC DRV CNTRSNK CANN STRY -D: Performed by: PODIATRIST

## 2021-12-02 PROCEDURE — G0378 HOSPITAL OBSERVATION PER HR: HCPCS

## 2021-12-02 PROCEDURE — 01480 ANES OPEN PX LOWER L/A/F NOS: CPT | Performed by: ANESTHESIOLOGY

## 2021-12-02 PROCEDURE — 77030006788 HC BLD SAW OSC STRY -B: Performed by: PODIATRIST

## 2021-12-02 PROCEDURE — C1713 ANCHOR/SCREW BN/BN,TIS/BN: HCPCS | Performed by: PODIATRIST

## 2021-12-02 PROCEDURE — 77030036687 HC SHOE PSTOP S2SG -A: Performed by: PODIATRIST

## 2021-12-02 DEVICE — CANNULATED SCREW
Type: IMPLANTABLE DEVICE | Site: FOOT | Status: FUNCTIONAL
Brand: ASNIS

## 2021-12-02 RX ORDER — ONDANSETRON 2 MG/ML
INJECTION INTRAMUSCULAR; INTRAVENOUS AS NEEDED
Status: DISCONTINUED | OUTPATIENT
Start: 2021-12-02 | End: 2021-12-02 | Stop reason: HOSPADM

## 2021-12-02 RX ORDER — OXYCODONE AND ACETAMINOPHEN 5; 325 MG/1; MG/1
1 TABLET ORAL
Qty: 21 TABLET | Refills: 0 | Status: SHIPPED | OUTPATIENT
Start: 2021-12-02 | End: 2021-12-07

## 2021-12-02 RX ORDER — HYDROMORPHONE HYDROCHLORIDE 2 MG/ML
0.5 INJECTION, SOLUTION INTRAMUSCULAR; INTRAVENOUS; SUBCUTANEOUS
Status: DISCONTINUED | OUTPATIENT
Start: 2021-12-02 | End: 2021-12-02

## 2021-12-02 RX ORDER — AMOXICILLIN AND CLAVULANATE POTASSIUM 875; 125 MG/1; MG/1
1 TABLET, FILM COATED ORAL 2 TIMES DAILY
Qty: 28 TABLET | Refills: 0 | Status: SHIPPED | OUTPATIENT
Start: 2021-12-02 | End: 2021-12-16

## 2021-12-02 RX ORDER — NALOXONE HYDROCHLORIDE 0.4 MG/ML
INJECTION, SOLUTION INTRAMUSCULAR; INTRAVENOUS; SUBCUTANEOUS AS NEEDED
Status: DISCONTINUED | OUTPATIENT
Start: 2021-12-02 | End: 2021-12-02 | Stop reason: HOSPADM

## 2021-12-02 RX ORDER — MIDAZOLAM HYDROCHLORIDE 1 MG/ML
INJECTION, SOLUTION INTRAMUSCULAR; INTRAVENOUS AS NEEDED
Status: DISCONTINUED | OUTPATIENT
Start: 2021-12-02 | End: 2021-12-02 | Stop reason: HOSPADM

## 2021-12-02 RX ORDER — FAMOTIDINE 20 MG/1
20 TABLET, FILM COATED ORAL ONCE
Status: COMPLETED | OUTPATIENT
Start: 2021-12-02 | End: 2021-12-02

## 2021-12-02 RX ORDER — ONDANSETRON 2 MG/ML
4 INJECTION INTRAMUSCULAR; INTRAVENOUS
Status: DISCONTINUED | OUTPATIENT
Start: 2021-12-02 | End: 2021-12-03 | Stop reason: HOSPADM

## 2021-12-02 RX ORDER — LIDOCAINE HYDROCHLORIDE 10 MG/ML
0.1 INJECTION, SOLUTION EPIDURAL; INFILTRATION; INTRACAUDAL; PERINEURAL AS NEEDED
Status: DISCONTINUED | OUTPATIENT
Start: 2021-12-02 | End: 2021-12-02 | Stop reason: HOSPADM

## 2021-12-02 RX ORDER — PROPOFOL 10 MG/ML
INJECTION, EMULSION INTRAVENOUS AS NEEDED
Status: DISCONTINUED | OUTPATIENT
Start: 2021-12-02 | End: 2021-12-02 | Stop reason: HOSPADM

## 2021-12-02 RX ORDER — LIDOCAINE HYDROCHLORIDE 20 MG/ML
INJECTION, SOLUTION EPIDURAL; INFILTRATION; INTRACAUDAL; PERINEURAL AS NEEDED
Status: DISCONTINUED | OUTPATIENT
Start: 2021-12-02 | End: 2021-12-02 | Stop reason: HOSPADM

## 2021-12-02 RX ORDER — SODIUM CHLORIDE, SODIUM LACTATE, POTASSIUM CHLORIDE, CALCIUM CHLORIDE 600; 310; 30; 20 MG/100ML; MG/100ML; MG/100ML; MG/100ML
25 INJECTION, SOLUTION INTRAVENOUS CONTINUOUS
Status: DISCONTINUED | OUTPATIENT
Start: 2021-12-02 | End: 2021-12-02 | Stop reason: HOSPADM

## 2021-12-02 RX ORDER — SODIUM CHLORIDE, SODIUM LACTATE, POTASSIUM CHLORIDE, CALCIUM CHLORIDE 600; 310; 30; 20 MG/100ML; MG/100ML; MG/100ML; MG/100ML
75 INJECTION, SOLUTION INTRAVENOUS CONTINUOUS
Status: DISCONTINUED | OUTPATIENT
Start: 2021-12-02 | End: 2021-12-03 | Stop reason: HOSPADM

## 2021-12-02 RX ORDER — FENTANYL CITRATE 50 UG/ML
INJECTION, SOLUTION INTRAMUSCULAR; INTRAVENOUS AS NEEDED
Status: DISCONTINUED | OUTPATIENT
Start: 2021-12-02 | End: 2021-12-02 | Stop reason: HOSPADM

## 2021-12-02 RX ORDER — SODIUM CHLORIDE 0.9 % (FLUSH) 0.9 %
5-40 SYRINGE (ML) INJECTION AS NEEDED
Status: DISCONTINUED | OUTPATIENT
Start: 2021-12-02 | End: 2021-12-02 | Stop reason: HOSPADM

## 2021-12-02 RX ORDER — NALOXONE HYDROCHLORIDE 0.4 MG/ML
INJECTION, SOLUTION INTRAMUSCULAR; INTRAVENOUS; SUBCUTANEOUS
Status: COMPLETED
Start: 2021-12-02 | End: 2021-12-02

## 2021-12-02 RX ORDER — HYDROMORPHONE HYDROCHLORIDE 2 MG/ML
INJECTION, SOLUTION INTRAMUSCULAR; INTRAVENOUS; SUBCUTANEOUS AS NEEDED
Status: DISCONTINUED | OUTPATIENT
Start: 2021-12-02 | End: 2021-12-02 | Stop reason: HOSPADM

## 2021-12-02 RX ORDER — SODIUM CHLORIDE 0.9 % (FLUSH) 0.9 %
5-40 SYRINGE (ML) INJECTION EVERY 8 HOURS
Status: DISCONTINUED | OUTPATIENT
Start: 2021-12-02 | End: 2021-12-02 | Stop reason: HOSPADM

## 2021-12-02 RX ADMIN — NALOXONE HYDROCHLORIDE 0.04 MG: 0.4 INJECTION, SOLUTION INTRAMUSCULAR; INTRAVENOUS; SUBCUTANEOUS at 17:51

## 2021-12-02 RX ADMIN — MIDAZOLAM HYDROCHLORIDE 2 MG: 2 INJECTION, SOLUTION INTRAMUSCULAR; INTRAVENOUS at 14:45

## 2021-12-02 RX ADMIN — FENTANYL CITRATE 50 MCG: 50 INJECTION, SOLUTION INTRAMUSCULAR; INTRAVENOUS at 14:45

## 2021-12-02 RX ADMIN — HYDROMORPHONE HYDROCHLORIDE 1 MG: 2 INJECTION, SOLUTION INTRAMUSCULAR; INTRAVENOUS; SUBCUTANEOUS at 16:11

## 2021-12-02 RX ADMIN — FENTANYL CITRATE 50 MCG: 50 INJECTION, SOLUTION INTRAMUSCULAR; INTRAVENOUS at 15:02

## 2021-12-02 RX ADMIN — WATER 3 G: 1 INJECTION INTRAMUSCULAR; INTRAVENOUS; SUBCUTANEOUS at 15:05

## 2021-12-02 RX ADMIN — SODIUM CHLORIDE, SODIUM LACTATE, POTASSIUM CHLORIDE, AND CALCIUM CHLORIDE: 600; 310; 30; 20 INJECTION, SOLUTION INTRAVENOUS at 17:01

## 2021-12-02 RX ADMIN — ONDANSETRON 4 MG: 2 INJECTION INTRAMUSCULAR; INTRAVENOUS at 16:50

## 2021-12-02 RX ADMIN — HYDROMORPHONE HYDROCHLORIDE 1 MG: 2 INJECTION, SOLUTION INTRAMUSCULAR; INTRAVENOUS; SUBCUTANEOUS at 16:48

## 2021-12-02 RX ADMIN — PROPOFOL 200 MG: 10 INJECTION, EMULSION INTRAVENOUS at 14:52

## 2021-12-02 RX ADMIN — FAMOTIDINE 20 MG: 20 TABLET ORAL at 12:04

## 2021-12-02 RX ADMIN — NALOXONE HYDROCHLORIDE 0.04 MG: 0.4 INJECTION, SOLUTION INTRAMUSCULAR; INTRAVENOUS; SUBCUTANEOUS at 17:48

## 2021-12-02 RX ADMIN — LIDOCAINE HYDROCHLORIDE 80 MG: 20 INJECTION, SOLUTION EPIDURAL; INFILTRATION; INTRACAUDAL; PERINEURAL at 14:52

## 2021-12-02 RX ADMIN — SODIUM CHLORIDE, SODIUM LACTATE, POTASSIUM CHLORIDE, AND CALCIUM CHLORIDE 25 ML/HR: 600; 310; 30; 20 INJECTION, SOLUTION INTRAVENOUS at 11:47

## 2021-12-02 NOTE — H&P
Update History & Physical    The Patient's History and Physical of PCP was reviewed with the patient and I examined the patient. There was no change. The surgical site was confirmed by the patient and me. Plan:  The risk, benefits, expected outcome, and alternative to the recommended procedure have been discussed with the patient. Patient understands and wants to proceed with the procedure.     Electronically signed by Martine Webb DPM on 12/2/2021 at 1:47 PM

## 2021-12-02 NOTE — PERIOP NOTES
Patient arrived to PACU with nasal airway in at 1732. Somnolent. O2 via face mask at  10 L. . Hypertensive. 1735 Oral airway added by CRNA as patient is obstructing. Tamara Hidalgo 26. by CRNA. See her admin record. 1800 Dr. Shania Howard asked to look at patient. Patient has awakened enough to remove his nasal airway and oral airway. He keeps removing his O2 mask. Attempted placing him on a nasal canula but his O2 sat dropped to below 90%. Mask replaced. Lungs sound moist upon auscultaion. 18  Dr. Shania Howard returned to see pt. Patient has had several times when he coughed he brought up a small amount of pink tinged thin sputum. Request CXR.    1825 Dr Shania Howard repeated narcan as patient remains obtunded and continues to have hypoxia intermittently. 88-90% on 10 L mask. 1830 Patient more responsive after second dose of narcan. He clearly exhibits signs of sleep anena although he states he hasn't been diagnosed. When he is awake he is able to m.t. O2 sat 94-95% on 12 L, but when he falls asleep he becomes tachycpneic and O2 sats drop again. 1955 CXR done at bedside. 1939 Dr Louisa Garcia ,hospitilist, spoke with Krystyna Callejas RN who gave MD information on patient's post op course in PACU. Labs, EKG, ordered. 1925 Dr. Shania Howard notified Dr. Fátima Oseguera that patient will likely need admission and requested he notify the hopsitalist to see patient. 1931 ABG's drawn    [id number] [facility] on Non-rebreather at 100% by RT, Marguerite Underwood. 1950 12 lead EKG done, Labs drawn. 2000 O2 sat improved on NRB when patient can stay awake and is reminded to deep breath. As soon as he falls asleep he becomes tahcypneic and he has to be awakened and told to slow his breathing down. 2020 Update given to wife in waiting room. and she was brought to PACU to see patient. 2100 DR Leland Garcia, hospitalist on now called and requested to come and see patient to determine disposition for room tonight.      2110 RT requested to come and place patient on BIPAP    2125 Placed on BIPAP. Transfer order to telemetry placed by Dr. Ghislaine Daigle. 2140 Spoke with supervisor regarding bed assignment    26 Wife went home for the night. 2238 Patient required to have rapid COVID test for bed placement. Supervisor came to do rapid test. Patient removed from BIPAP to do test. Placed on O2 4L/NC while off BIPAP.    2316 Patient off BIPAP since 2238 to drink apple juice. While off unable to m.t. O2 sat consistently above 90%. 18 Dr. Ghislaine Daigle in to see. 80 Wife informed of room number via phone. 12/3/2021 0100 Waiting for room to be cleaned on CVT stepdown. Patient sleeping. Awakens easily now. Still requires BIPAP to m.t. O2 sats.

## 2021-12-02 NOTE — ANESTHESIA PREPROCEDURE EVALUATION
Relevant Problems   No relevant active problems       Anesthetic History   No history of anesthetic complications            Review of Systems / Medical History  Patient summary reviewed and pertinent labs reviewed    Pulmonary  Within defined limits                 Neuro/Psych   Within defined limits           Cardiovascular    Hypertension: well controlled              Exercise tolerance: >4 METS     GI/Hepatic/Renal                Endo/Other    Diabetes: well controlled, type 2    Morbid obesity     Other Findings              Physical Exam    Airway      Neck ROM: normal range of motion   Mouth opening: Normal     Cardiovascular    Rhythm: regular  Rate: normal         Dental  No notable dental hx       Pulmonary  Breath sounds clear to auscultation               Abdominal  GI exam deferred       Other Findings            Anesthetic Plan    ASA: 3  Anesthesia type: general          Induction: Intravenous  Anesthetic plan and risks discussed with: Patient

## 2021-12-02 NOTE — PERIOP NOTES
Patient arrived to PACU with nasal airway in at 1732. Somnolent. O2 via face mask at  10 L. . Hypertensive. 1735 Oral airway added by CRNA as patient is obstructing. Tamara Hidalgo 26. by CRNA. See her admin record. 1800 Dr. Coco Szymanski asked to look at patient. Patient has awakened enough to remove his nasal airway and oral airway. He keeps removing his O2 mask. Attempted placing him on a nasal canula but his O2 sat dropped to below 90%. Mask replaced. Lungs sound moist upon auscultaion. 18  Dr. Coco Szymanski returned to see pt. Patient has had several times when he coughed he brought up a small amount of pink tinged thin sputum. Request CXR.    1825 Dr Coco Szymanski repeated narcan as patient remains obtunded and continues to have hypoxia intermittently. 88-90% on 10 L mask. 1830 Patient more responsive after second dose of narcan. He clearly exhibits signs of sleep anena although he states he hasn't been diagnosed. When he is awake he is able to m.t. O2 sat 94-95% on 12 L, but when he falls asleep he becomes tachycpneic and O2 sats drop again. 1955 CXR done at bedside. 1939 Dr Dalia Mejia ,hospitilist, spoke with Antoinette Blackman RN who gave MD information on patient's post op course in PACU. Labs, EKG, ordered. 1925 Dr. Coco Szymanski notified Dr. Antonio Price that patient will likely need admission and requested he notify the hopsitalist to see patient. 1931 ABG's drawn    [id number] [facility] on Non-rebreather at 100% by RT, Rashard Horn. 1950 12 lead EKG done, Labs drawn. 2000 O2 sat improved on NRB when patient can stay awake and is reminded to deep breath. As soon as he falls asleep he becomes tahcypneic and he has to be awakened and told to slow his breathing down. 2020 Update given to wife in waiting room. and she was brought to PACU to see patient. 2100 DR Naty Roper, hospitalist on now called and requested to come and see patient to determine disposition for [facility].      2110 RT requested to come and place patient on BIPAP    2125 Placed on BIPAP. Transfer order to telemetry placed by Dr. Rena Celeste. 2140 Spoke with supervisor regarding bed assignment    26 Wife went home for the night. 2238 Patient required to have rapid COVID test for bed placement. Supervisor came to do rapid test. Patient removed from BIPAP to do test. Placed on O2 4L/NC while off BIPAP.    2316 Patient off BIPAP since 2238 to drink apple juice. While off unable to m.t. O2 sat consistently above 90%. 18 Dr. Rena Celeste in to see. 80 Wife informed of room number via phone. 12/3/2021 0100 Waiting for room to be cleaned on CVT stepdown. Patient sleeping. Awakens easily now. Still requires BIPAP to m.t. O2 sats.

## 2021-12-02 NOTE — BRIEF OP NOTE
Brief Postoperative Note    Patient: Ana Hdz  YOB: 1970  MRN: [id number]    Date of Procedure: 12/2/2021     Pre-Op Diagnosis: LEFT FOOT: SECOND AND THIRD HAMMERTOES, PLANTAR FLEXED AND LONG SECOND AND THIRD METATARSALS    Post-Op Diagnosis: Same as preoperative diagnosis. Procedure(s):  LEFT FOOT: SECOND AND THIRD TOE ARTHROPLASTY, SECOND AND THIRD METATARSAL OSTEOTOMIES    Surgeon(s):  Genevieve Bowers DPM    Surgical Assistant: Surg Asst-1: Claritza Mackenzie    Anesthesia: General     Estimated Blood Loss (mL): Minimal    Complications: None    Specimens: * No specimens in log *     Implants:   Implant Name Type Inv.  Item Serial No.  Lot No. LRB No. Used Action   SCREW BNE L13MM DIA2MM THRD L6MM DEREJE TRACY HND FT TI SELF - [id number]  SCREW BNE L13MM DIA2MM THRD L6MM DEREJE TRACY HND FT TI SELF  [employer] HOW_WD 0000 Left 1 Implanted   SCREW BNE L12MM DIA2MM THRD L5MM NONSTERILE DEREJE TRACY HND FT - [id number]  SCREW BNE L12MM DIA2MM THRD L5MM NONSTERILE DEREJE TRACY HND FT  [employer] HOW_WD 0000 Left 2 Implanted       Drains: * No LDAs found *      Electronically Signed by Yanira Sal DPM on 12/2/2021 at 5:04 PM

## 2021-12-03 LAB
COVID-19 RAPID TEST, COVR: NOT DETECTED
SOURCE, COVRS: NORMAL

## 2021-12-03 PROCEDURE — 2709999900 HC NON-CHARGEABLE SUPPLY

## 2021-12-03 PROCEDURE — 74011250636 HC RX REV CODE- 250/636: Performed by: STUDENT IN AN ORGANIZED HEALTH CARE EDUCATION/TRAINING PROGRAM

## 2021-12-03 PROCEDURE — 74011250637 HC RX REV CODE- 250/637: Performed by: HOSPITALIST

## 2021-12-03 PROCEDURE — 74011000250 HC RX REV CODE- 250: Performed by: STUDENT IN AN ORGANIZED HEALTH CARE EDUCATION/TRAINING PROGRAM

## 2021-12-03 PROCEDURE — 94660 CPAP INITIATION&MGMT: CPT

## 2021-12-03 PROCEDURE — 74011250636 HC RX REV CODE- 250/636: Performed by: HOSPITALIST

## 2021-12-03 PROCEDURE — 77010033678 HC OXYGEN DAILY

## 2021-12-03 PROCEDURE — 99220 PR INITIAL OBSERVATION CARE/DAY 70 MINUTES: CPT | Performed by: STUDENT IN AN ORGANIZED HEALTH CARE EDUCATION/TRAINING PROGRAM

## 2021-12-03 PROCEDURE — G0378 HOSPITAL OBSERVATION PER HR: HCPCS

## 2021-12-03 PROCEDURE — 94762 N-INVAS EAR/PLS OXIMTRY CONT: CPT

## 2021-12-03 PROCEDURE — 74011250637 HC RX REV CODE- 250/637: Performed by: STUDENT IN AN ORGANIZED HEALTH CARE EDUCATION/TRAINING PROGRAM

## 2021-12-03 RX ORDER — LABETALOL HCL 20 MG/4 ML
10 SYRINGE (ML) INTRAVENOUS ONCE
Status: COMPLETED | OUTPATIENT
Start: 2021-12-03 | End: 2021-12-03

## 2021-12-03 RX ORDER — CEFTRIAXONE 1 G/1
1 INJECTION, POWDER, FOR SOLUTION INTRAMUSCULAR; INTRAVENOUS EVERY 24 HOURS
Status: DISCONTINUED | OUTPATIENT
Start: 2021-12-03 | End: 2021-12-03 | Stop reason: RX

## 2021-12-03 RX ORDER — OXYCODONE AND ACETAMINOPHEN 5; 325 MG/1; MG/1
1 TABLET ORAL ONCE
Status: COMPLETED | OUTPATIENT
Start: 2021-12-03 | End: 2021-12-03

## 2021-12-03 RX ORDER — SODIUM CHLORIDE 0.9 % (FLUSH) 0.9 %
5-40 SYRINGE (ML) INJECTION AS NEEDED
Status: DISCONTINUED | OUTPATIENT
Start: 2021-12-03 | End: 2021-12-05 | Stop reason: HOSPADM

## 2021-12-03 RX ORDER — SODIUM CHLORIDE 0.9 % (FLUSH) 0.9 %
5-40 SYRINGE (ML) INJECTION EVERY 8 HOURS
Status: DISCONTINUED | OUTPATIENT
Start: 2021-12-03 | End: 2021-12-05 | Stop reason: HOSPADM

## 2021-12-03 RX ORDER — OXYCODONE AND ACETAMINOPHEN 5; 325 MG/1; MG/1
1 TABLET ORAL
Status: DISCONTINUED | OUTPATIENT
Start: 2021-12-03 | End: 2021-12-05 | Stop reason: HOSPADM

## 2021-12-03 RX ORDER — POLYETHYLENE GLYCOL 3350 17 G/17G
17 POWDER, FOR SOLUTION ORAL DAILY PRN
Status: DISCONTINUED | OUTPATIENT
Start: 2021-12-03 | End: 2021-12-05 | Stop reason: HOSPADM

## 2021-12-03 RX ORDER — ACETAMINOPHEN 325 MG/1
650 TABLET ORAL
Status: DISCONTINUED | OUTPATIENT
Start: 2021-12-03 | End: 2021-12-05 | Stop reason: HOSPADM

## 2021-12-03 RX ORDER — ACETAMINOPHEN 650 MG/1
650 SUPPOSITORY RECTAL
Status: DISCONTINUED | OUTPATIENT
Start: 2021-12-03 | End: 2021-12-05 | Stop reason: HOSPADM

## 2021-12-03 RX ORDER — METOPROLOL TARTRATE 25 MG/1
25 TABLET, FILM COATED ORAL 2 TIMES DAILY
Status: DISCONTINUED | OUTPATIENT
Start: 2021-12-03 | End: 2021-12-05 | Stop reason: HOSPADM

## 2021-12-03 RX ADMIN — OXYCODONE HYDROCHLORIDE AND ACETAMINOPHEN 1 TABLET: 5; 325 TABLET ORAL at 11:12

## 2021-12-03 RX ADMIN — OXYCODONE HYDROCHLORIDE AND ACETAMINOPHEN 1 TABLET: 5; 325 TABLET ORAL at 16:11

## 2021-12-03 RX ADMIN — Medication 10 ML: at 22:00

## 2021-12-03 RX ADMIN — ACETAMINOPHEN 650 MG: 325 TABLET ORAL at 10:15

## 2021-12-03 RX ADMIN — WATER 1 G: 1 INJECTION INTRAMUSCULAR; INTRAVENOUS; SUBCUTANEOUS at 02:28

## 2021-12-03 RX ADMIN — LABETALOL HYDROCHLORIDE 10 MG: 5 INJECTION, SOLUTION INTRAVENOUS at 11:11

## 2021-12-03 RX ADMIN — ACETAMINOPHEN 650 MG: 325 TABLET ORAL at 02:24

## 2021-12-03 RX ADMIN — Medication 10 ML: at 16:27

## 2021-12-03 RX ADMIN — Medication 10 ML: at 02:24

## 2021-12-03 RX ADMIN — AZITHROMYCIN DIHYDRATE 500 MG: 500 INJECTION, POWDER, LYOPHILIZED, FOR SOLUTION INTRAVENOUS at 01:15

## 2021-12-03 RX ADMIN — METOPROLOL TARTRATE 25 MG: 25 TABLET, FILM COATED ORAL at 18:13

## 2021-12-03 RX ADMIN — Medication 10 ML: at 10:18

## 2021-12-03 NOTE — PERIOP NOTES
TRANSFER - OUT REPORT:    Verbal report given to April(name) on Amirah Gandhi  being transferred to room 2307 (unit) for routine progression of care       Report consisted of patients Situation, Background, Assessment and   Recommendations(SBAR). Information from the following report(s) SBAR, OR Summary, Intake/Output and Recent Results was reviewed with the receiving nurse. Lines:   Peripheral IV 12/02/21 Posterior; Right Hand (Active)   Site Assessment Clean, dry, & intact 12/02/21 1740   Phlebitis Assessment 0 12/02/21 1740   Infiltration Assessment 0 12/02/21 1740   Dressing Status Clean, dry, & intact 12/02/21 1740   Dressing Type Transparent; Tape 12/02/21 1740   Hub Color/Line Status Pink; Infusing 12/02/21 1740        Opportunity for questions and clarification was provided.       Patient transported with:100% NRB for transport then will return to [facility]

## 2021-12-03 NOTE — PROGRESS NOTES
Patient seen and evaluated, lying in bed, no acute distress. Patient admitted by my colleague earlier this morning. 59-year-old -American male with a history of prediabetes, morbid obesity underwent hammertoe surgery earlier by podiatry. Patient arrived in PACU and was noted to be somnolent and hypoxic. Patient required 2 doses of Narcan after which he became more responsive. Currently patient is tachycardic and hypertensive, have given patient IV labetalol. Chest x-ray also shows bilateral airspace opacities consistent with community-acquired pneumoniapatient has been started on IV antibiotics. Continue current treatment plan and further as outlined in H&P, will follow. Discussed with patient who verbalized understanding.

## 2021-12-03 NOTE — ANESTHESIA POSTPROCEDURE EVALUATION
Procedure(s):  LEFT FOOT: SECOND AND THIRD TOE ARTHRODESIS, SECOND AND THIRD METATARSAL OSTEOTOMIES. MAC, general - backup    Anesthesia Post Evaluation      Multimodal analgesia: multimodal analgesia used between 6 hours prior to anesthesia start to PACU discharge  Patient location during evaluation: bedside  Patient participation: complete - patient participated  Level of consciousness: awake  Pain management: adequate  Airway patency: patent  Anesthetic complications: no  Cardiovascular status: stable  Respiratory status: acceptable  Hydration status: acceptable  Post anesthesia nausea and vomiting:  controlled      INITIAL Post-op Vital signs:   Vitals Value Taken Time   /80 12/02/21 2312   Temp 36.4 °C (97.6 °F) 12/02/21 1732   Pulse 116 12/02/21 2316   Resp 21 12/02/21 2316   SpO2 87 % 12/02/21 2316   Vitals shown include unvalidated device data.

## 2021-12-03 NOTE — H&P
History & Physical    Patient: Iain Villasenor MRN: [id number]  CSN: [id number]    YOB: 1970  Age: 46 y.o. Sex: male      DOA: 12/2/2021    Chief Complaint: hypoxia        HPI:     Iain Villasenor is a 46 y.o.  male with hx of prediabetes. Patient underwent hammertoe surgery earlier today. Patient arrived in PACU somnolent and hypoxic. Patient required 2 doses of Narcan after which she became more responsive. Per RN Ailyn's notation from PACU, patient able to maintain O2 sats between 94 to 95% on 12 L nasal cannula while awake. However, he was often falling asleep and becoming tachypneic and exhibiting hypoxia. Patient needed to be placed on BiPAP. Unable to maintain adequate oxygenation with nasal cannula. Upon interview, patient on BiPAP. He has no increased work of breathing. He is able to speak in full, clear sentences. And looks quite comfortable. Per patient, he has no significant past medical history including underlying lung pathology. No history of heart disease. He takes no chronic medications. This is the first time he has undergone anesthesia. Chest x-ray reviewed. Patient with bilateral lung opacities greater on right. Covid negative preoperatively on November 26. He denies any preoperative symptoms such as shortness of breath, cough, wheeze. States that shortness of breath and chest tightness has only begun since undergoing surgery. No recent sick contacts. Past Medical History:   Diagnosis Date    Hypertension     wife denies this    Pre-diabetes        Surgical hx - LEFT FOOT: SECOND AND THIRD TOE ARTHROPLASTY, SECOND AND THIRD METATARSAL OSTEOTOMIES (12/2/21.      Family History   Problem Relation Age of Onset    Diabetes Mother     Stroke Father        Social History     Socioeconomic History    Marital status:    Tobacco Use    Smoking status: Never Smoker    Smokeless tobacco: Never Used   Vaping Use    Vaping Use: Never used   Substance and Sexual Activity    Alcohol use: Never    Drug use: Never    Sexual activity: Not Currently       Prior to Admission medications    Medication Sig Start Date End Date Taking? Authorizing Provider   oxyCODONE-acetaminophen (PERCOCET) 5-325 mg per tablet Take 1 Tablet by mouth every six (6) hours as needed for Pain for up to 5 days. Max Daily Amount: 4 Tablets. 21 Yes Bajani, Vihang A, DPM   amoxicillin-clavulanate (AUGMENTIN) 875-125 mg per tablet Take 1 Tablet by mouth two (2) times a day for 14 days. 21 Yes Bajani, Vihang A, DPM   trospium (SANCTURA XL) 60 mg capsule Take 1 Capsule by mouth Daily (before breakfast). 21   Polizos, Sandi, NP       No Known Allergies      Review of Systems  GENERAL: No fever, chills, malaise  HEENT: No change in vision, sore throat or sinus congestion. NECK: No pain or stiffness. PULMONARY: +shortness of breath, +cough, +wheeze. Cardiovascular: no pnd or orthopnea, no CP  GASTROINTESTINAL: No abdominal pain, nausea, vomiting or diarrhea  MUSCULOSKELETAL: no recent trauma. DERMATOLOGIC: No rash, no itching, no lesions. ENDOCRINE: No polyuria, polydipsia  HEMATOLOGICAL: No anemia or easy bruising or bleeding. NEUROLOGIC: No headache. Physical Exam:     Physical Exam:  Visit Vitals  /88   Pulse (!) 109   Temp 97.6 °F (36.4 °C)   Resp (!) 32   Ht 5' 11\" (1.803 m)   Wt 138.3 kg (305 lb)   SpO2 100%   BMI 42.54 kg/m²      O2 Device: BIPAP    Temp (24hrs), Av.6 °F (36.4 °C), Min:97.6 °F (36.4 °C), Max:97.6 °F (36.4 °C)    1901 -  0700  In: -   Out: 500 [Urine:500]   701 - 1900  In: 1100 [I.V.:1100]  Out: -     General:  Alert, cooperative, no distress, appears stated age. Head: Normocephalic, without obvious abnormality, atraumatic. Eyes:  Conjunctivae/corneas clear. PERRL, EOMs intact. Nose: Nares normal.  No flaring.    Neck: Supple, symmetrical, trachea midline   Lungs:    Clear bilaterally although difficult to auscultate secondary to habitus. No clear rhonchi wheezes or rales. No cough during interview. On BiPAP. Heart:  Regular rate and rhythm, S1, S2 normal.     Abdomen: Soft, non-tender. Extremities:  Left foot with bandage. Clean dry and intact. No signs of bleeding. Pulses: 2+ and symmetric all extremities. Skin:  No rashes or lesions   Neurologic: AAOx3, No focal motor or sensory deficit. Labs Reviewed: All lab results for the last 24 hours reviewed.   Recent Results (from the past 24 hour(s))   EKG, 12 LEAD, INITIAL    Collection Time: 12/02/21 11:37 AM   Result Value Ref Range    Ventricular Rate 106 BPM    Atrial Rate 106 BPM    P-R Interval 126 ms    QRS Duration 150 ms    Q-T Interval 380 ms    QTC Calculation (Bezet) 504 ms    Calculated P Axis 51 degrees    Calculated R Axis 30 degrees    Calculated T Axis 30 degrees    Diagnosis       Sinus tachycardia  Right bundle branch block  Abnormal ECG  No previous ECGs available  Confirmed by Valentino Kingdom MD, --- ([id number]) on 12/2/2021 2:04:20 PM     GLUCOSE, POC    Collection Time: 12/02/21 11:40 AM   Result Value Ref Range    Glucose (POC) 136 (H) 70 - 110 mg/dL   BLOOD GAS, ARTERIAL POC    Collection Time: 12/02/21  7:37 PM   Result Value Ref Range    Device: OXYGEN MASK      FIO2 (POC) 60 %    pH (POC) 7.27 (L) 7.35 - 7.45      pCO2 (POC) 51.3 (H) 35.0 - 45.0 MMHG    pO2 (POC) 41 (LL) 80 - 100 MMHG    HCO3 (POC) 23.5 22 - 26 MMOL/L    sO2 (POC) 67.7 (L) 92 - 97 %    Base deficit (POC) 4.1 mmol/L    Set Rate 26 bpm    Allens test (POC) Positive      Site LEFT RADIAL      Specimen type (POC) ARTERIAL      Performed by Mana Arora     Critical value read back LI    CBC WITH AUTOMATED DIFF    Collection Time: 12/02/21  7:55 PM   Result Value Ref Range    WBC 9.4 4.6 - 13.2 K/uL    RBC 5.40 4.35 - 5.65 M/uL    HGB 14.8 13.0 - 16.0 g/dL    HCT 48.7 (H) 36.0 - 48.0 % MCV 90.2 78.0 - 100.0 FL    MCH 27.4 24.0 - 34.0 PG    MCHC 30.4 (L) 31.0 - 37.0 g/dL    RDW 14.0 11.6 - 14.5 %    PLATELET 562 523 - 312 K/uL    MPV 11.4 9.2 - 11.8 FL    NRBC 0.0 0  WBC    ABSOLUTE NRBC 0.00 0.00 - 0.01 K/uL    NEUTROPHILS 69 40 - 73 %    LYMPHOCYTES 26 21 - 52 %    MONOCYTES 4 3 - 10 %    EOSINOPHILS 1 0 - 5 %    BASOPHILS 1 0 - 2 %    IMMATURE GRANULOCYTES 0 0.0 - 0.5 %    ABS. NEUTROPHILS 6.5 1.8 - 8.0 K/UL    ABS. LYMPHOCYTES 2.5 0.9 - 3.6 K/UL    ABS. MONOCYTES 0.3 0.05 - 1.2 K/UL    ABS. EOSINOPHILS 0.1 0.0 - 0.4 K/UL    ABS. BASOPHILS 0.1 0.0 - 0.1 K/UL    ABS. IMM. GRANS. 0.0 0.00 - 0.04 K/UL    DF AUTOMATED     METABOLIC PANEL, BASIC    Collection Time: 12/02/21  7:55 PM   Result Value Ref Range    Sodium 138 136 - 145 mmol/L    Potassium 3.9 3.5 - 5.5 mmol/L    Chloride 105 100 - 111 mmol/L    CO2 23 21 - 32 mmol/L    Anion gap 10 3.0 - 18 mmol/L    Glucose 173 (H) 74 - 99 mg/dL    BUN 12 7.0 - 18 MG/DL    Creatinine 1.30 0.6 - 1.3 MG/DL    BUN/Creatinine ratio 9 (L) 12 - 20      GFR est AA >60 >60 ml/min/1.73m2    GFR est non-AA 58 (L) >60 ml/min/1.73m2    Calcium 8.7 8.5 - 10.1 MG/DL   GLUCOSE, POC    Collection Time: 12/02/21  8:05 PM   Result Value Ref Range    Glucose (POC) 143 (H) 70 - 110 mg/dL   SARS-COV-2    Collection Time: 12/02/21 11:00 PM   Result Value Ref Range    SARS-CoV-2 Please find results under separate order     COVID-19 RAPID TEST    Collection Time: 12/02/21 11:00 PM   Result Value Ref Range    Specimen source Nasopharyngeal      COVID-19 rapid test Not detected NOTD          XR Results (most recent):  Results from Hospital Encounter encounter on 12/02/21    XR CHEST PORT    Narrative  Portable Chest    CPT CODE: 25602    HISTORY: Postop. FINDINGS:    Right greater than left lung opacities more dense on the right. No effusion or  pneumothorax. Heart size and mediastinal contours within normal limits. No acute  fracture.  Wires and tubes overlie the patient. .    Impression  Bilateral lung opacities suggestive of air disease right greater than left. Correlate for pneumonia. Pulmonary edema thought less likely. CT Results  (Last 48 hours)    None            Procedures/imaging: see electronic medical records for all procedures/Xrays and details which were not copied into this note but were reviewed prior to creation of Plan      Assessment/Plan     Active Problems:    Post-op pneumonia (12/2/2021)       Assessment  1. Community-acquired pneumonia  2. Acute hypoxic, hypercarbic respiratory failure   #1-2. Observation. Continue BiPAP. Covid testing negative. Start ceftriaxone and azithromycin. Patient would likely benefit from outpatient sleep study. 3. Prediabetes  #3. Monitor blood glucose on BMP. Patient is not on any antihyperglycemics at home. 4. History of left-sided hammertoe, now postop day 0  #4. Monitor postop H&H. Managed by Dr. Emma Jean Baptiste. Diet: NPO while on bipap, advance when weaned to regular. DVT/GI Prophylaxis: SCD's  Code Status: full     Wife updated of patient's admission and room number. Disclaimer: Sections of this note are dictated using utilizing voice recognition software. Minor typographical errors may be present. If questions arise, please do not hesitate to contact me or call our department.         Marquez Dumont, [facility]

## 2021-12-03 NOTE — PROGRESS NOTES
Problem: Falls - Risk of  Goal: *Absence of Falls  Description: Document Bard Quispe Fall Risk and appropriate interventions in the flowsheet.   Outcome: Progressing Towards Goal  Note: Fall Risk Interventions:  Mobility Interventions: Patient to call before getting OOB         Medication Interventions: Patient to call before getting OOB, Bed/chair exit alarm    Elimination Interventions: Call light in reach, Patient to call for help with toileting needs, Urinal in reach              Problem: Patient Education: Go to Patient Education Activity  Goal: Patient/Family Education  Outcome: Progressing Towards Goal     Problem: Pain  Goal: *Control of Pain  Outcome: Progressing Towards Goal     Problem: Patient Education: Go to Patient Education Activity  Goal: Patient/Family Education  Outcome: Progressing Towards Goal     Problem: Patient Education: Go to Patient Education Activity  Goal: Patient/Family Education  Outcome: Progressing Towards Goal

## 2021-12-03 NOTE — PROGRESS NOTES
Reason for Admission:  Post-op pneumonia [J95.89, J18.9]                 RUR Score:    4           Plan for utilizing home health: To be determined                    Likelihood of Readmission:   LOW                         Transition of Care Plan:              Initial assessment completed with patient. Cognitive status of patient: oriented to time, place, person and situation. Face sheet information confirmed:  yes. The patient designates his wife Charmaine Webber  to participate in his discharge plan and to receive any needed information. This patient lives in a  home with wife. Patient is not able to navigate steps as needed. Prior to hospitalization, patient was considered to be independent with ADLs/IADLS : yes . Patient has a current ACP document on file: no      Healthcare Decision Maker:     Click here to complete [employer] including selection of the Healthcare Decision Maker Relationship (ie \"Primary\")    The wife will be available to transport patient home upon discharge. The patient already has none reported  medical equipment available in the home. Patient is not currently active with home health. Patient has not stayed in a skilled nursing facility or rehab. Was  stay within last 60 days : no. This patient is on dialysis :no     Currently, the discharge plan is home health vs home    The patient states that he can obtain his medications from the pharmacy, and take his medications as directed. Patient's current insurance is [facility]      Care Management Interventions  PCP Verified by CM: Yes  Mode of Transport at Discharge:  Other (see comment) (family)  Transition of Care Consult (CM Consult): Discharge Planning  Support Systems: Spouse/Significant Other  Confirm Follow Up Transport: Family  Discharge Location  Discharge Placement: Home with home health (vs home with family assistance)        AMY Gaona RN  Care Management  Pager: [phone]

## 2021-12-03 NOTE — PROGRESS NOTES
1500: Bedside and Verbal shift change report given to This writer (oncoming nurse) by Lea Benitez RN  (offgoing nurse). Report included the following information SBAR, Kardex, Intake/Output, MAR, Recent Results and Cardiac Rhythm SR-ST.    1550: Patient c/o pain at surgical site, called MD to discuss pain management, one time dose of percocet ordered. 1900: Bedside and Verbal shift change report given to Fidel Patel RN  (oncoming nurse) by Ese Main RN  (offgoing nurse).  Report included the following information SBAR, Kardex, Intake/Output, MAR, Recent Results and Cardiac Rhythm ST.

## 2021-12-04 VITALS
TEMPERATURE: 99.6 F | WEIGHT: 305 LBS | RESPIRATION RATE: 19 BRPM | HEART RATE: 104 BPM | OXYGEN SATURATION: 93 % | DIASTOLIC BLOOD PRESSURE: 85 MMHG | SYSTOLIC BLOOD PRESSURE: 126 MMHG | BODY MASS INDEX: 42.7 KG/M2 | HEIGHT: 71 IN

## 2021-12-04 LAB
ANION GAP SERPL CALC-SCNC: 4 MMOL/L (ref 3–18)
BUN SERPL-MCNC: 8 MG/DL (ref 7–18)
BUN/CREAT SERPL: 7 (ref 12–20)
CALCIUM SERPL-MCNC: 8.6 MG/DL (ref 8.5–10.1)
CHLORIDE SERPL-SCNC: 104 MMOL/L (ref 100–111)
CO2 SERPL-SCNC: 30 MMOL/L (ref 21–32)
CREAT SERPL-MCNC: 1.1 MG/DL (ref 0.6–1.3)
ERYTHROCYTE [DISTWIDTH] IN BLOOD BY AUTOMATED COUNT: 14.3 % (ref 11.6–14.5)
GLUCOSE SERPL-MCNC: 109 MG/DL (ref 74–99)
HCT VFR BLD AUTO: 37.3 % (ref 36–48)
HGB BLD-MCNC: 11.7 G/DL (ref 13–16)
LACTATE SERPL-SCNC: 1.2 MMOL/L (ref 0.4–2)
MCH RBC QN AUTO: 27.5 PG (ref 24–34)
MCHC RBC AUTO-ENTMCNC: 31.4 G/DL (ref 31–37)
MCV RBC AUTO: 87.6 FL (ref 78–100)
NRBC # BLD: 0 K/UL (ref 0–0.01)
NRBC BLD-RTO: 0 PER 100 WBC
PLATELET # BLD AUTO: 222 K/UL (ref 135–420)
PMV BLD AUTO: 11.8 FL (ref 9.2–11.8)
POTASSIUM SERPL-SCNC: 3.6 MMOL/L (ref 3.5–5.5)
RBC # BLD AUTO: 4.26 M/UL (ref 4.35–5.65)
SODIUM SERPL-SCNC: 138 MMOL/L (ref 136–145)
WBC # BLD AUTO: 11.3 K/UL (ref 4.6–13.2)

## 2021-12-04 PROCEDURE — 83605 ASSAY OF LACTIC ACID: CPT

## 2021-12-04 PROCEDURE — 80048 BASIC METABOLIC PNL TOTAL CA: CPT

## 2021-12-04 PROCEDURE — 2709999900 HC NON-CHARGEABLE SUPPLY

## 2021-12-04 PROCEDURE — 36415 COLL VENOUS BLD VENIPUNCTURE: CPT

## 2021-12-04 PROCEDURE — 65270000029 HC RM PRIVATE

## 2021-12-04 PROCEDURE — 77030040392 HC DRSG OPTIFOAM MDII -A

## 2021-12-04 PROCEDURE — 85027 COMPLETE CBC AUTOMATED: CPT

## 2021-12-04 PROCEDURE — 99238 HOSP IP/OBS DSCHRG MGMT 30/<: CPT | Performed by: HOSPITALIST

## 2021-12-04 PROCEDURE — G0378 HOSPITAL OBSERVATION PER HR: HCPCS

## 2021-12-04 PROCEDURE — 74011250636 HC RX REV CODE- 250/636: Performed by: STUDENT IN AN ORGANIZED HEALTH CARE EDUCATION/TRAINING PROGRAM

## 2021-12-04 PROCEDURE — 74011250637 HC RX REV CODE- 250/637: Performed by: HOSPITALIST

## 2021-12-04 PROCEDURE — 97161 PT EVAL LOW COMPLEX 20 MIN: CPT

## 2021-12-04 PROCEDURE — 74011000250 HC RX REV CODE- 250: Performed by: STUDENT IN AN ORGANIZED HEALTH CARE EDUCATION/TRAINING PROGRAM

## 2021-12-04 RX ORDER — METOPROLOL TARTRATE 25 MG/1
25 TABLET, FILM COATED ORAL 2 TIMES DAILY
Qty: 60 TABLET | Refills: 0 | Status: SHIPPED | OUTPATIENT
Start: 2021-12-04

## 2021-12-04 RX ADMIN — Medication 10 ML: at 05:26

## 2021-12-04 RX ADMIN — WATER 1 G: 1 INJECTION INTRAMUSCULAR; INTRAVENOUS; SUBCUTANEOUS at 00:16

## 2021-12-04 RX ADMIN — AZITHROMYCIN 250 MG: 500 INJECTION, POWDER, LYOPHILIZED, FOR SOLUTION INTRAVENOUS at 00:16

## 2021-12-04 RX ADMIN — OXYCODONE HYDROCHLORIDE AND ACETAMINOPHEN 1 TABLET: 5; 325 TABLET ORAL at 00:24

## 2021-12-04 RX ADMIN — METOPROLOL TARTRATE 25 MG: 25 TABLET, FILM COATED ORAL at 08:48

## 2021-12-04 NOTE — PROGRESS NOTES
D/C order noted for today. Orders reviewed. Patient's wife to transport patient home at time of discharge. No needs identified at this time. CM remains available if needed.           Navdeep Sanchez, [phone]

## 2021-12-04 NOTE — PROGRESS NOTES
Problem: Mobility Impaired (Adult and Pediatric)  Goal: Interventions  12/4/2021 1150 by Marielena Simon PT  Outcome: Progressing Towards Goal   Physical Therapy Goals  Initiated 12/4/2021 and to be accomplished within 7 day(s)  1. Patient will move from supine to sit and sit to supine  in bed with modified independence. 2.  Patient will transfer from bed to chair and chair to bed with modified independence using the least restrictive device. 3.  Patient will perform sit to stand with modified independence. 4.  Patient will ambulate with modified independence for 50 feet with the least restrictive device. 5.  Patient will ascend/descend 2 stairs with (U) handrail(s) with modified independence. PLOF: Independent. Pt's wife is at home and able to assist as needed. Pt has 2 MARIAN. PHYSICAL THERAPY EVALUATION    Patient: Eun Mckeon (54 y.o. male)  Date: 12/4/2021  Primary Diagnosis: Post-op pneumonia [J95.89, J18.9]  Procedure(s) (LRB):  LEFT FOOT: SECOND AND THIRD TOE ARTHRODESIS, SECOND AND THIRD METATARSAL OSTEOTOMIES (Left) 2 Days Post-Op   Precautions: Fall   NWB (L) forefoot  PLOF: Independent    ASSESSMENT :  Based on the objective data described below, the patient presents with impaired functional mobility, gait dysfunction, balance deficits, and decreased activity tolerance 2/2 2nd and 3rd toe arthroplasty (L). Pt educated on NWB forefoot to (L) foot during ambulation, pt reporting increased pain and demonstrate NWB with RW instead. Pt demo no LOB and reported no complaints of lightheadedness/dizziness. Pt educated on energy conservation techniques, to ambulate short distances at home multiple times a day instead of one long bout of ambulation, verbalized understanding. Pt educated on need for RW with potential follow-up with podiatry regarding knee scooter, per pt's request, for longer distances.     Patient will benefit from skilled intervention to address the above impairments. Patient's rehabilitation potential is considered to be Good  Factors which may influence rehabilitation potential include:   []         None noted  []         Mental ability/status  [x]         Medical condition  [x]         Home/family situation and support systems  [x]         Safety awareness  [x]         Pain tolerance/management  []         Other:      PLAN :  Recommendations and Planned Interventions:   [x]           Bed Mobility Training             [x]    Neuromuscular Re-Education  [x]           Transfer Training                   []    Orthotic/Prosthetic Training  [x]           Gait Training                          []    Modalities  [x]           Therapeutic Exercises           []    Edema Management/Control  [x]           Therapeutic Activities            [x]    Family Training/Education  [x]           Patient Education  []           Other (comment):    Frequency/Duration: Patient will be followed by physical therapy 1-2 times per day/4-7 days per week to address goals. Discharge Recommendations: Home Health  Further Equipment Recommendations for Discharge: 3-in-1 bedside commode and rolling walker, knee scooter for community distances     SUBJECTIVE:   Patient stated Elwyn Furbish I get a knee scooter?     OBJECTIVE DATA SUMMARY:     Past Medical History:   Diagnosis Date    Hypertension     wife denies this    Pre-diabetes    History reviewed. No pertinent surgical history.   Barriers to Learning/Limitations: None  Compensate with: N/A  Home Situation:  Home Situation  Home Environment: Private residence  # Steps to Enter: 2  Rails to Enter: Yes  Hand Rails : Right  One/Two Story Residence: One story  Living Alone: No  Support Systems: Spouse/Significant Other, Child(keysha)  Patient Expects to be Discharged to[de-identified] House  Current DME Used/Available at Home: None  Tub or Shower Type: Tub/Shower combination  Critical Behavior:  Neurologic State: Alert  Orientation Level: Oriented X4  Cognition: Appropriate decision making; Appropriate for age attention/concentration; Appropriate safety awareness; Follows commands  Psychosocial  Patient Behaviors: Calm; Cooperative  Purposeful Interaction: Yes  Pt Identified Daily Priority: Clinical issues (comment)  Caritas Process: Nurture loving kindness; Establish trust; Teaching/learning; Attend basic human needs; Create healing environment  Caring Interventions: Reassure; Therapeutic modalities  Reassure: Therapeutic listening; Informing; Caring rounds  Therapeutic Modalities: Humor; Intentional therapeutic touch  Skin Condition/Temp: Warm; Dry  Skin Integrity: Incision (comment)  Skin Integumentary  Skin Condition/Temp: Warm; Dry  Skin Integrity: Incision (comment)  Strength:    Strength: Generally decreased, functional ((L) ankle and foot not assessed)  Tone & Sensation:   Sensation: Intact  Range Of Motion:  AROM: Generally decreased, functional ((L) ankle and foot not assessed)  PROM: Generally decreased, functional  Posture:  Posture (WDL): Exceptions to WDL  Posture Assessment: Forward head; Rounded shoulders  Functional Mobility:  Transfers:  Sit to Stand: Supervision  Stand to Sit: Supervision  Balance:   Sitting: Intact  Standing: Impaired; With support  Standing - Static: Good  Standing - Dynamic : Fair  Ambulation/Gait Training:  Distance (ft): 20 Feet (ft)  Assistive Device: Walker, rolling  Ambulation - Level of Assistance: Stand-by assistance  Gait Description (WDL): Exceptions to WDL  Gait Abnormalities: Decreased step clearance  Left Side Weight Bearing: Non-weight bearing (NWB to (L) forefoot with surgical shoe)  Base of Support: Center of gravity altered  Speed/Joanne: Slow; Pace decreased (<100 feet/min)  Step Length: Right shortened; Left shortened  Therapeutic Exercises:   Pt educated on performing BLE therex where he is NWB to LLE.  Pt questioning gym exercises, recommended [place] exercises only such as machines (Leg extension, leg curls, hip abd/adduction). Pain:  Pain level pre-treatment: 0/10   Pain level post-treatment: 0/10   Pain Intervention(s) : Medication (see MAR); Rest, Ice, Repositioning  Response to intervention: Nurse notified, See doc flow  Activity Tolerance:   Fair  Please refer to the flowsheet for vital signs taken during this treatment. After treatment:   []         Patient left in no apparent distress sitting up in chair  [x]         Patient left in no apparent distress on toilet  [x]         Call bell left within reach  [x]         Nursing notified  []         Caregiver present  []         Bed alarm activated  []         SCDs applied    COMMUNICATION/EDUCATION:   [x]         Role of Physical Therapy in the acute care setting. [x]         Fall prevention education was provided and the patient/caregiver indicated understanding. [x]         Patient/family have participated as able in goal setting and plan of care. [x]         Patient/family agree to work toward stated goals and plan of care. []         Patient understands intent and goals of therapy, but is neutral about his/her participation. []         Patient is unable to participate in goal setting/plan of care: ongoing with therapy staff.  []         Other:     Thank you for this referral.  Jens Liang PT, DPT   Time Calculation: 13 mins      Eval Complexity: History: LOW Complexity : Zero comorbidities / personal factors that will impact the outcome / POCExam:LOW Complexity : 1-2 Standardized tests and measures addressing body structure, function, activity limitation and / or participation in recreation  Presentation: LOW Complexity : Stable, uncomplicated  Clinical Decision Making:Low Complexity    Overall Complexity:LOW

## 2021-12-04 NOTE — PROGRESS NOTES
CM called [facility] transportation [phone]  4 times, transportation phone line is not working. [facility] is unable to assist with transport for this patient at this time.          Abel Mejia RN  Case Management [phone]

## 2021-12-04 NOTE — PROGRESS NOTES
Bedside and Verbal shift change report received from Daxa Sullivan  (offgoing nurse). Report included the following information SBAR, Kardex, Intake/Output, Recent Results and Cardiac Rhythm NSR/Sinus TAchycardia. 1950: AOX4, on 3l NC, resting on recliner watching tv; no complaints voiced at this time; no shortness of breath at rest; left foot dressing intact; NSR on tele; shift assessment done    2145: up to the bathroom using walker; no weightbearing on forefoot observed; tolerated activity well    2300: resting on recliner watching tv    0024: IV atb given; Percocet 1 tab given po as requested for pain- see flowsheet    0110: sleeping; no visual indicators for pain noted    0240: sleeping on recliner    0500: sleeping n recliner; sinus Tachy 104 on monitor    0700: awake, quietly resting on recliner    Bedside and Verbal shift change report given to [employer] (oncoming nurse) by Johnnie Vickers RN (offgoing nurse). Report included the following information SBAR, Kardex, Intake/Output, Recent Results and Cardiac Rhythm Sinus Tachycardia.

## 2021-12-04 NOTE — PROGRESS NOTES
Occupational Therapy Note:orders received, chart reviewed and note his modified independent level of functional mobility as per PT assessment. He is currently waiting for transportation for his discharge to home. No skilled OT needs anticipated at this time.  Ken Cordero OTR/L

## 2021-12-04 NOTE — PROGRESS NOTES
Progress Note    Patient: Kavita Melissa MRN: [id number]  SSN: [id number]    YOB: 1970  Age: 46 y.o. Sex: male      Admit Date: 12/2/2021  1 Day Post-Op     Procedure:   Procedure(s):  LEFT FOOT: SECOND AND THIRD TOE ARTHROPLASTY, SECOND AND THIRD METATARSAL OSTEOTOMIES    Subjective:     Patient seen resting quiet and comfortably and no apparent distress. Patient states that earlier he had pain to left foot however it subsidized now after receiving percocet. Patient presently has no breathing problem and resting comfortably. Patient denies N/V/C/F. Objective:     Visit Vitals  BP (!) 149/77   Pulse (!) 108   Temp 98.6 °F (37 °C)   Resp 23   Ht 5' 11\" (1.803 m)   Wt 138.3 kg (305 lb)   SpO2 90%   BMI 42.54 kg/m²        Physical Exam:  Left second and third toe surgical site noted to have mild swelling, mild pain noted with palpation of surgical site. Skin edges well approximated, skin sutures intact. Pedal pulses intact. Able to move all toes on left foot. Assessment:     Hospital Problems  Date Reviewed: 9/22/2021          Codes Class Noted POA    Post-op pneumonia ICD-10-CM: J95.89, J18.9  ICD-9-CM: 997.39, 156  12/2/2021 Unknown              Plan/Recommendations/Medical Decision Making:     - Dressings reinforced with dry gauze and ace bandage.   - Patient to remain NWB to left forefoot in surgical shoe. - Continue ice and elevation of left foot. - Patient is stable to discharge from foot standpoint. Rec to keep dressings dry, intact till next office appointment in 1 week.

## 2021-12-04 NOTE — DISCHARGE INSTRUCTIONS
DISCHARGE SUMMARY from Nurse    PATIENT INSTRUCTIONS:    After general anesthesia or intravenous sedation, for 24 hours or while taking prescription Narcotics:  · Limit your activities  · Do not drive and operate hazardous machinery  · Do not make important personal or business decisions  · Do  not drink alcoholic beverages  · If you have not urinated within 8 hours after discharge, please contact your surgeon on call. Report the following to your surgeon:  · Excessive pain, swelling, redness or odor of or around the surgical area  · Temperature over 100.5  · Nausea and vomiting lasting longer than 4 hours or if unable to take medications  · Any signs of decreased circulation or nerve impairment to extremity: change in color, persistent  numbness, tingling, coldness or increase pain  · Any questions    What to do at Home:  Recommended activity: Activity as tolerated and no driving for today. *  Please give a list of your current medications to your Primary Care Provider. *  Please update this list whenever your medications are discontinued, doses are      changed, or new medications (including over-the-counter products) are added. *  Please carry medication information at all times in case of emergency situations. These are general instructions for a healthy lifestyle:    No smoking/ No tobacco products/ Avoid exposure to second hand smoke  Surgeon General's Warning:  Quitting smoking now greatly reduces serious risk to your health. Obesity, smoking, and sedentary lifestyle greatly increases your risk for illness    A healthy diet, regular physical exercise & weight monitoring are important for maintaining a healthy lifestyle    You may be retaining fluid if you have a history of heart failure or if you experience any of the following symptoms:  Weight gain of 3 pounds or more overnight or 5 pounds in a week, increased swelling in our hands or feet or shortness of breath while lying flat in bed. Please call your doctor as soon as you notice any of these symptoms; do not wait until your next office visit. Patient armband removed and shredded    The discharge information has been reviewed with the patient and spouse. The patient and spouse verbalized understanding. Discharge medications reviewed with the patient and spouse and appropriate educational materials and side effects teaching were provided. INSTRUCTIONS FOLLOWING FOOT SURGERY    ACTIVITY:  · Elevate feet were 48 hours  · Use ice as directed by your doctor  · Use crutches as directed by your doctor    DIET:  · Clear liquids until no nausea or vomiting; then light diet for the first day  · An advance to regular diet On second day, unless your doctor orders otherwise. PAIN:  · Take pain medication ouster acted by your doctor. · Call your doctor if pain is not relieved by medication. · Do not take aspirin or blood thinners until directed by your doctor. DRESSING CARE: ***       FOLLOW-UP PHONE CALLS:  · Calls will be made by nursing staff. · If you had any problems, call your doctor as needed. CALL YOUR DOCTOR IF:  · Excessive bleeding that does not stop after holding mild pressure over the area  · Temperature of 101° F or above  · Redness, excessive swelling or bruising, and/or green or yellow, smelly discharge from incision  · Loss of sensation -cold, white, or blue toes    AFTER ANESTHESIA :   · For the first 24 hours: do not drive, drink alcoholic beverages, or make important decisions. · Be aware of dizziness following anesthesia and while taking pain medication. ___________________________________________________________________________________________________________________________________    [employer] Activation    Thank you for requesting access to [facility]. Please follow the instructions below to securely access and download your online medical record.  [facility] allows you to send messages to your doctor, view your test results, renew your prescriptions, schedule appointments, and more. How Do I Sign Up? 1. In your internet browser, go to [email or web address] [email or web address]  2. Click on the First Time User? Click Here link in the Sign In box. You will be redirect to the New Member Sign Up page. 3. Enter your [facility] Access Code exactly as it appears below. You will not need to use this code after youve completed the sign-up process. If you do not sign up before the expiration date, you must request a new code. MyChart Access Code: Activation code not generated  Current [facility] Status: Active (This is the date your [employer] access code will )    4. Enter the last four digits of your Social Security Number (xxxx) and Date of Birth (mm/dd/yyyy) as indicated and click Submit. You will be taken to the next sign-up page. 5. Create a [facility] ID. This will be your [facility] login ID and cannot be changed, so think of one that is secure and easy to remember. 6. Create a [facility] password. You can change your password at any time. 7. Enter your Password Reset Question and Answer. This can be used at a later time if you forget your password. 8. Enter your e-mail address. You will receive e-mail notification when new information is available in [address] 9. Click Sign Up. You can now view and download portions of your medical record. 10. Click the Download Summary menu link to download a portable copy of your medical information. Additional Information    If you have questions, please visit the Frequently Asked Questions section of the [facility] website at [email or web address] Remember, [facility] is NOT to be used for urgent needs. For medical emergencies, dial 911.

## 2021-12-04 NOTE — PROGRESS NOTES
Problem: Falls - Risk of  Goal: *Absence of Falls  Description: Document Kalyn Hayes Fall Risk and appropriate interventions in the flowsheet.   Outcome: Progressing Towards Goal  Note: Fall Risk Interventions:  Mobility Interventions: Patient to call before getting OOB, PT Consult for mobility concerns, Utilize walker, cane, or other assistive device, OT consult for ADLs         Medication Interventions: Patient to call before getting OOB    Elimination Interventions: Patient to call for help with toileting needs, Urinal in reach, Call light in reach              Problem: Patient Education: Go to Patient Education Activity  Goal: Patient/Family Education  Outcome: Progressing Towards Goal     Problem: Pain  Goal: *Control of Pain  Outcome: Progressing Towards Goal     Problem: Patient Education: Go to Patient Education Activity  Goal: Patient/Family Education  Outcome: Progressing Towards Goal     Problem: Patient Education: Go to Patient Education Activity  Goal: Patient/Family Education  Outcome: Progressing Towards Goal

## 2021-12-04 NOTE — DISCHARGE SUMMARY
Hospitalist Discharge Summary    Patient: Scooter Estrada MRN: [id number]  CSN: [id number]    YOB: 1970  Age: 46 y.o. Sex: male    DOA: 12/2/2021 LOS:  LOS: 0 days   Discharge Date:     Admission Diagnoses: Post-op pneumonia [J95.89, J18.9]    Discharge Diagnoses:    1. Community-acquired pneumonia  2. Hypertensive urgency  3. Morbid obesity  4. Left foot hammertoe surgery    Discharge Condition: [address]    Discharge To: Home    PHYSICAL EXAM  Visit Vitals  /68   Pulse (!) 118   Temp 99.6 °F (37.6 °C)   Resp 22   Ht 5' 11\" (1.803 m)   Wt 138.3 kg (305 lb)   SpO2 93%   BMI 42.54 kg/m²       General: Alert, cooperative, no acute distress    HEENT: PERRLA, EOMI. Anicteric sclerae. Lungs:  CTA Bilaterally. No Wheezing/Rhonchi/Rales. Heart:  Regular rate and Rhythm. Abdomen: Soft, Non distended, Non tender. + Bowel sounds. Extremities: No edema/ cyanosis/ clubbing  Neurologic:  AA oriented X 3. Moves all extremities. HPI:  Scooter Estrada is a 46 y.o.  male with hx of prediabetes. Patient underwent hammertoe surgery earlier today. Patient arrived in PACU somnolent and hypoxic. Patient required 2 doses of Narcan after which she became more responsive. Per ELLEN Robertson's notation from PACU, patient able to maintain O2 sats between 94 to 95% on 12 L nasal cannula while awake. However, he was often falling asleep and becoming tachypneic and exhibiting hypoxia. Patient needed to be placed on BiPAP. Unable to maintain adequate oxygenation with nasal cannula. Upon interview, patient on BiPAP. He has no increased work of breathing. He is able to speak in full, clear sentences. And looks quite comfortable. Per patient, he has no significant past medical history including underlying lung pathology. No history of heart disease. He takes no chronic medications. This is the first time he has undergone anesthesia.   Chest x-ray reviewed. Patient with bilateral lung opacities greater on right. Covid negative preoperatively on November 26. He denies any preoperative symptoms such as shortness of breath, cough, wheeze. States that shortness of breath and chest tightness has only begun since undergoing surgery. No recent sick contacts. Hospital Course:   Patient admitted to the hospital post hammertoe surgery, left foot. Postop he was noted to be hypoxic and somnolent. Chest x-ray showed possible atelectasis versus infiltrate, patient started on IV antibiotics. He was also noted to have elevated blood pressure and heart rate. Patient was started on medications and pain control. Patient is morbidly obese and has been counseled to lose weight. He has also been advised to follow-up and get sleep study as an outpatient. Patient is currently at baseline and is being discharged home. He was advised to closely follow-up with his primary care physician, podiatry in 2 weeks. Patient verbalized understanding. Follow up Care: With PCP and podiatry in 2 weeks    Consults: Podiatry    Significant Diagnostic Studies:     Imaging:  XR Results (most recent):  Results from Hospital Encounter encounter on 12/02/21    XR CHEST PORT    Narrative  Portable Chest    CPT CODE: 73342    HISTORY: Postop. FINDINGS:    Right greater than left lung opacities more dense on the right. No effusion or  pneumothorax. Heart size and mediastinal contours within normal limits. No acute  fracture. Wires and tubes overlie the patient. .    Impression  Bilateral lung opacities suggestive of air disease right greater than left. Correlate for pneumonia. Pulmonary edema thought less likely. CT Results (most recent):  Results from Hospital Encounter encounter on 07/10/21    CT MAXILLOFACIAL W CONT    Narrative  CT maxillofacial with enhancement    Indication: Dental pain. Right-sided dental pain.     Technique: Contrast-enhanced maxillofacial CT    Dose reduction techniques used: Automated exposure control, adjustment of the  mAs and/or kVp according to patient size, standardized low-dose protocol, and/or  iterative reconstruction technique. Comparison: None. Findings: There is a dental cavity in the right maxillary first molar with  periapical lucency and the overlying dental abscess which extends into the soft  tissues overlying the maxilla and measures up to 1.5 x 0.8 cm. There is no other  definite dental abscess. There is overlying soft tissue swelling. There is no other acute abnormality identified. Impression  Dental abscess arising from the first right maxillary molar with overlying fluid  collection measuring 1.5 x 0.8 cm. There is surrounding soft tissue swelling. 10/05/20    ECHO ADULT COMPLETE 10/05/2020 10/5/2020    Interpretation Summary  · LV: Estimated LVEF is 55 - 60%. Normal cavity size, systolic function (ejection fraction normal) and diastolic function. Moderate concentric hypertrophy. Wall motion: normal.  · LA: Left Atrium volume index is 27.14 mL/m2. · RV: Normal right ventricular size and function. · No hemodynamically significant valvular pathology. · Pericardium: Pericardial fat pad present. Signed by: Love Victor MD on 10/5/2020 11:52 AM         Procedures:   Left foot hammertoe surgery    Discharge Medications:     Current Discharge Medication List      START taking these medications    Details   metoprolol tartrate (LOPRESSOR) 25 mg tablet Take 1 Tablet by mouth two (2) times a day. Qty: 60 Tablet, Refills: 0      oxyCODONE-acetaminophen (PERCOCET) 5-325 mg per tablet Take 1 Tablet by mouth every six (6) hours as needed for Pain for up to 5 days. Max Daily Amount: 4 Tablets. Qty: 21 Tablet, Refills: 0    Associated Diagnoses: Pain at surgical incision      amoxicillin-clavulanate (AUGMENTIN) 875-125 mg per tablet Take 1 Tablet by mouth two (2) times a day for 14 days.   Qty: 28 Tablet, Refills: 0         CONTINUE these medications which have NOT CHANGED    Details   trospium (SANCTURA XL) 60 mg capsule Take 1 Capsule by mouth Daily (before breakfast). Qty: 90 Capsule, Refills: 3             Current Facility-Administered Medications:     sodium chloride (NS) flush 5-40 mL, 5-40 mL, IntraVENous, Q8H, Shayla Otoole, DO, 10 mL at 12/04/21 0526    sodium chloride (NS) flush 5-40 mL, 5-40 mL, IntraVENous, PRN, Shayla Otoole, DO    acetaminophen (TYLENOL) tablet 650 mg, 650 mg, Oral, Q6H PRN, 650 mg at 12/03/21 1015 **OR** acetaminophen (TYLENOL) suppository 650 mg, 650 mg, Rectal, Q6H PRN, Shayla Otoole, DO    polyethylene glycol (MIRALAX) packet 17 g, 17 g, Oral, DAILY PRN, Shayla Otoole, DO    azithromycin (ZITHROMAX) 250 mg in 0.9% sodium chloride 250 mL IVPB, 250 mg, IntraVENous, Q24H, Shayla Otoole, DO, Last Rate: 250 mL/hr at 12/04/21 0016, 250 mg at 12/04/21 0016    cefTRIAXone (ROCEPHIN) 1 g in sterile water (preservative free) 10 mL IV syringe, 1 g, IntraVENous, Q24H, Shayla Otoole, DO, 1 g at 12/04/21 0016    oxyCODONE-acetaminophen (PERCOCET) 5-325 mg per tablet 1 Tablet, 1 Tablet, Oral, Q6H PRN, Nneka Smith MD, 1 Tablet at 12/04/21 0024    metoprolol tartrate (LOPRESSOR) tablet 25 mg, 25 mg, Oral, BID, Nneka Smith MD, 25 mg at 12/04/21 0848     Activity: Activity as tolerated    Diet: Cardiac Diet    Wound Care: Keep wound clean and dry      Desean Mayo MD  12/4/2021, 10:50 AM    Total time spent 28 mins  Disclaimer: Sections of this note are dictated using utilizing voice recognition software. Minor typographical errors may be present. If questions arise, please do not hesitate to contact me or call our department.

## 2021-12-04 NOTE — OP NOTES
Operative Report     Patient: Tsering Pelaez MRN: [id number]  SSN: [id number]    YOB: 1970  Age: 46 y.o. Sex: male       Indications: The patient was seen in office for painful skin lesion in ball of left foot. Patient also has contracted second and third toes on left foot. Patient had pain with ambulation and wearing shoe gear. Patient failed conservative treatment and wanted to proceed with surgical intervention. Possible complications discussed including but not limited to delayed healing, non-healing, infection, swelling, scarring, neuritis, requirement of additional surgery. No guarantee made to outcome of surgery. Patient voiced understanding and signed consent. Date of Surgery: 12/2/2021     Preoperative Diagnosis:  LEFT FOOT: SECOND AND THIRD HAMMERTOES, PLANTAR FLEXED AND LONG SECOND AND THIRD METATARSALS    Postoperative Diagnosis: Same    Surgeon(s) and Role:     * Moises Bennett DPM - Primary      Surgical Assistants: Operating Room Staff    Anesthesia: MAC with local    Procedure:  Procedure(s):  LEFT FOOT: SECOND AND THIRD TOE ARTHROPLASTY, SECOND AND THIRD METATARSAL OSTEOTOMIES    Procedure in Detail:     The patient was brought into the operating room, and placed on the operating table in a supine position. After IV sedation from the Department of Anesthesia, local block consisting of 20 mL of 1:1 mixture of 1% lidocaine plain and 0.5% Marcaine plain was administered proximal to the surgical site on the left foot. Pneumatic ankle tourniquet was applied to the left thigh, but not yet inflated. Left foot was then prepped and draped in the usual aseptic manner. Esmarch bandage was then utilized to exsanguinate the patient's left lower extremity and then the pneumatic thigh tourniquet was inflated to 325 mmHg.     Attention was then directed to the dorsal aspect of the left second toe, where incision was performed extending from the proximal PIPJ to the mid shaft of the second metatarsal. The incision was deepened down to the subcutaneous tissue with care taken to identify and retract all important neurovascularr structures. A transverse tenotomy and capsulotomy was performed at the PIPJ level with a fresh #15 blade. Then, using J-maneuver, the collateral ligaments were released. The extensor digitorum longus tendon was reflected proximally exposing the head of the proximal phalanx, then using a sagittal bone saw, the head of the proximal phalanx was resected. The extensor digitorum longus tendon was retracted laterally at MTPJ and then the capsular incision was performed with #15 blade down to the bone level. The capsule was reflected medially and laterally exposing the head of the second metatarsal. First, McGlamry elevator was then placed under the second metatarsal head and the plantar adhesions were released, and then we went ahead and performed an oblique osteotomy of the second metatarsal in a Weil  fashion, and metatarsal head was shortened to allow for the composition of the shortening of the other metatarsals. This was held in position and then secured with a single 2 mm x 12 mm screw. Over hanging bone was resected at second metatarsal with a rongeur. Attention was then directed to the dorsal aspect of the left third toe, where incision was performed extending from the proximal PIPJ to the mid shaft of the third metatarsal. The incision was deepened down to the subcutaneous tissue with care taken to identify and retract all important neurovascularr structures. A transverse tenotomy and capsulotomy was performed at the PIPJ level with a fresh #15 blade. Then, using J-maneuver, the collateral ligaments were released. The extensor digitorum longus tendon was reflected proximally exposing the head of the proximal phalanx, then using a sagittal bone saw, the head of the proximal phalanx was resected.   The extensor digitorum longus tendon was retracted laterally at MTPJ and then the capsular incision was performed with #15 blade down to the bone level. The capsule was reflected medially and laterally exposing the head of the third metatarsal. First, McGlamry elevator was then placed under the third metatarsal head and the plantar adhesions were released, and then we went ahead and performed an oblique osteotomy of the third metatarsal in a Weil  fashion, and metatarsal head was shortened to allow for the composition of the shortening of the other metatarsals. This was held in position and then secured with a single 2 mm x 12 mm screw. Over hanging bone was resected with a rongeur. Intraoperative fluoroscopy verified the position of all the metatarsals and the parabola was well maintained without any problems. The surgical site was then irrigated with normal saline solution. Subcutaneous tissue was closed with 3-0 Vicryl and subcuticular closure was performed with 4-0 Monocryl. The surgical site was then reinforced with Steri-Strip and dressed with Betadine-soaked Adaptic, 4x4, Kerlix, and an Ace bandage. A pneumatic thigh tourniquet was then deflated on the left foot and prompt hyperemic response was noted to all toes of the left foot. Attention was then directed to the ball of the foot where the benign hyperkeratotic skin lesion was debrided with # 15 blade. Findings:  Bone quality noted to be osteoporotic at second and third metatarsal heads    Estimated Blood Loss:  Minimal    Drains: none           Specimens: * No specimens in log *            Implants:    Implant Name Type Inv.  Item Serial No.  Lot No. LRB No. Used Action   SCREW BNE L13MM DIA2MM THRD L6MM DEREJE TRACY HND FT TI SELF - [id number]  SCREW BNE L13MM DIA2MM THRD L6MM DEREJE TRACY HND FT TI SELF  [employer] [id number] Left 1 Implanted   SCREW BNE L12MM DIA2MM THRD L5MM NONSTERILE DEREJE TRACY HND FT - [id number]  SCREW BNE L12MM DIA2MM THRD L5MM NONSTERILE DEREJE TRACY HND FT  [employer] HOWM_[id number] 0000 Left 2 Implanted              Complications:  None

## 2022-03-18 PROBLEM — I10 HYPERTENSION: Status: ACTIVE | Noted: 2021-09-22

## 2022-03-18 PROBLEM — D12.6 BENIGN NEOPLASM OF COLON: Status: ACTIVE | Noted: 2021-09-22

## 2022-03-18 PROBLEM — J18.9 POST-OP PNEUMONIA: Status: ACTIVE | Noted: 2021-12-02

## 2022-03-18 PROBLEM — Z12.11 SCREENING FOR MALIGNANT NEOPLASM OF COLON: Status: ACTIVE | Noted: 2020-11-13

## 2022-03-18 PROBLEM — D72.820 LYMPHOCYTOSIS: Status: ACTIVE | Noted: 2020-11-23

## 2022-03-18 PROBLEM — J95.89 POST-OP PNEUMONIA: Status: ACTIVE | Noted: 2021-12-02

## 2022-03-19 PROBLEM — E11.9 DIABETES MELLITUS WITHOUT COMPLICATION (HCC): Status: ACTIVE | Noted: 2020-11-23

## 2022-03-19 PROBLEM — E66.01 OBESITY, MORBID (HCC): Status: ACTIVE | Noted: 2019-07-22

## 2022-03-19 PROBLEM — I51.7 LVH (LEFT VENTRICULAR HYPERTROPHY): Status: ACTIVE | Noted: 2020-10-12

## 2022-03-19 PROBLEM — N52.9 IMPOTENCE: Status: ACTIVE | Noted: 2020-11-23

## 2022-03-19 PROBLEM — I87.2 PERIPHERAL VENOUS INSUFFICIENCY: Status: ACTIVE | Noted: 2020-11-23

## 2022-03-19 PROBLEM — Z83.3 FAMILY HISTORY OF DIABETES MELLITUS: Status: ACTIVE | Noted: 2020-11-23

## 2022-03-20 PROBLEM — R60.0 PEDAL EDEMA: Status: ACTIVE | Noted: 2020-11-23

## 2022-03-20 PROBLEM — E78.5 HYPERLIPIDEMIA: Status: ACTIVE | Noted: 2020-11-23

## 2024-04-02 ENCOUNTER — TRANSCRIBE ORDERS (OUTPATIENT)
Facility: HOSPITAL | Age: 54
End: 2024-04-02

## 2024-04-02 ENCOUNTER — HOSPITAL ENCOUNTER (OUTPATIENT)
Facility: HOSPITAL | Age: 54
Discharge: HOME | End: 2024-04-05

## 2024-04-02 DIAGNOSIS — R05.9 COUGH, UNSPECIFIED TYPE: ICD-10-CM

## 2024-04-02 DIAGNOSIS — R05.9 COUGH, UNSPECIFIED TYPE: Primary | ICD-10-CM

## 2024-04-02 PROCEDURE — 71046 X-RAY EXAM CHEST 2 VIEWS: CPT

## (undated) DEVICE — STRIP,CLOSURE,WOUND,MEDI-STRIP,1/2X4: Brand: MEDLINE

## (undated) DEVICE — PREP SKN CHLRAPRP APL 26ML STR --

## (undated) DEVICE — DRAPE TWL SURG 16X26IN BLU ORB04] ALLCARE INC]

## (undated) DEVICE — X-RAY SPONGES,12 PLY: Brand: DERMACEA

## (undated) DEVICE — DERMACEA GAUZE ROLL: Brand: DERMACEA

## (undated) DEVICE — K-WIRE: Brand: ASNIS

## (undated) DEVICE — INTENDED FOR TISSUE SEPARATION, AND OTHER PROCEDURES THAT REQUIRE A SHARP SURGICAL BLADE TO PUNCTURE OR CUT.: Brand: BARD-PARKER SAFETY BLADES SIZE 10, STERILE

## (undated) DEVICE — TABLE COVER: Brand: CONVERTORS

## (undated) DEVICE — GOWN,REINFORCED,POLY,AURORA,XLARGE,STRL: Brand: MEDLINE

## (undated) DEVICE — BANDAGE,GAUZE,BULKEE II,4.5"X4.1YD,STRL: Brand: MEDLINE

## (undated) DEVICE — SOLUTION IV 1000ML 0.9% SOD CHL

## (undated) DEVICE — SUTURE VCRL SZ 3-0 L27IN ABSRB UD L26MM SH 1/2 CIR J416H

## (undated) DEVICE — GAUZE,SPONGE,4"X4",16PLY,STRL,LF,10/TRAY: Brand: MEDLINE

## (undated) DEVICE — COVER LT HNDL FLX

## (undated) DEVICE — CUFF TRNQT AD W4IN 34IN CIRC SURG GEL SGL PRT BLDR PNEUMAT

## (undated) DEVICE — SUTURE VCRL SZ 4-0 L27IN ABSRB UD L26MM SH 1/2 CIR J415H

## (undated) DEVICE — REM POLYHESIVE ADULT PATIENT RETURN ELECTRODE: Brand: VALLEYLAB

## (undated) DEVICE — COUNTERSINK SURG DIA2MM CANN ADD ON CPL DISP FOR ASNS MIC

## (undated) DEVICE — SHOE POSTOP PREMIER PRO M RUB SOLE HK AND LOOP CLSR NYL L

## (undated) DEVICE — Z DISCONTINUED USE 2272124 DRAPE SURG XL N INVASIVE 2 LAYR DISP

## (undated) DEVICE — SUTURE COAT VCRL PC 5 PRECIS COSM CONVENTIONAL CUT PRIM 3 8 J823H

## (undated) DEVICE — BANDAGE COBAN 4 IN COMPR W4INXL5YD FOAM COHESIVE QUIK STK SELF ADH SFT

## (undated) DEVICE — PACK PROCEDURE SURG MAJ W/ BASIN LF

## (undated) DEVICE — BIT DRILL CANN 17MM

## (undated) DEVICE — THREE-QUARTER SHEET: Brand: CONVERTORS

## (undated) DEVICE — INTENDED FOR TISSUE SEPARATION, AND OTHER PROCEDURES THAT REQUIRE A SHARP SURGICAL BLADE TO PUNCTURE OR CUT.: Brand: BARD-PARKER ® CARBON RIB-BACK BLADES

## (undated) DEVICE — NON-ADHERENT STRIPS,OIL EMULSION: Brand: CURITY

## (undated) DEVICE — DRAPE,EXTREMITY,89X128,STERILE: Brand: MEDLINE

## (undated) DEVICE — PRECISION THIN (9.0 X 0.38 X 25.0MM)

## (undated) DEVICE — 3M™ STERI-STRIP™ COMPOUND BENZOIN TINCTURE 40 BAGS/CARTON 4 CARTONS/CASE C1544: Brand: 3M™ STERI-STRIP™

## (undated) DEVICE — BANDAGE,ELASTIC,ESMARK,STERILE,4"X9',LF: Brand: MEDLINE

## (undated) DEVICE — BLADE SAW OSC COARSE 25X9MM --

## (undated) DEVICE — KIT CLN UP BON SECOURS MARYV